# Patient Record
Sex: FEMALE | Race: WHITE
[De-identification: names, ages, dates, MRNs, and addresses within clinical notes are randomized per-mention and may not be internally consistent; named-entity substitution may affect disease eponyms.]

---

## 2018-05-23 ENCOUNTER — HOSPITAL ENCOUNTER (OUTPATIENT)
Dept: HOSPITAL 94 - WOUND CARE | Age: 83
Discharge: HOME | End: 2018-05-23
Attending: SURGERY
Payer: MEDICARE

## 2018-05-23 DIAGNOSIS — L89.229: ICD-10-CM

## 2018-05-23 DIAGNOSIS — Z86.73: ICD-10-CM

## 2018-05-23 DIAGNOSIS — L97.521: ICD-10-CM

## 2018-05-23 DIAGNOSIS — E11.621: ICD-10-CM

## 2018-05-23 DIAGNOSIS — I70.243: ICD-10-CM

## 2018-05-23 DIAGNOSIS — L89.629: ICD-10-CM

## 2018-05-23 DIAGNOSIS — E11.622: Primary | ICD-10-CM

## 2018-05-23 DIAGNOSIS — L97.321: ICD-10-CM

## 2018-05-23 DIAGNOSIS — L97.511: ICD-10-CM

## 2018-05-23 DIAGNOSIS — L97.421: ICD-10-CM

## 2018-05-23 DIAGNOSIS — L98.491: ICD-10-CM

## 2018-05-23 DIAGNOSIS — L89.529: ICD-10-CM

## 2018-05-23 DIAGNOSIS — L89.219: ICD-10-CM

## 2018-05-23 DIAGNOSIS — L89.899: ICD-10-CM

## 2018-05-23 DIAGNOSIS — L97.411: ICD-10-CM

## 2018-05-23 DIAGNOSIS — L97.311: ICD-10-CM

## 2018-05-23 DIAGNOSIS — L97.811: ICD-10-CM

## 2018-05-23 DIAGNOSIS — L89.519: ICD-10-CM

## 2018-05-23 PROCEDURE — 99215 OFFICE O/P EST HI 40 MIN: CPT

## 2018-05-23 PROCEDURE — 36416 COLLJ CAPILLARY BLOOD SPEC: CPT

## 2018-05-23 PROCEDURE — 82948 REAGENT STRIP/BLOOD GLUCOSE: CPT

## 2018-05-23 PROCEDURE — 93925 LOWER EXTREMITY STUDY: CPT

## 2018-06-27 ENCOUNTER — HOSPITAL ENCOUNTER (OUTPATIENT)
Dept: HOSPITAL 94 - WOUND CARE | Age: 83
Discharge: HOME | End: 2018-06-27
Attending: SURGERY
Payer: MEDICARE

## 2018-06-27 DIAGNOSIS — Z79.01: ICD-10-CM

## 2018-06-27 DIAGNOSIS — E43: ICD-10-CM

## 2018-06-27 DIAGNOSIS — L89.223: ICD-10-CM

## 2018-06-27 DIAGNOSIS — M86.8X7: ICD-10-CM

## 2018-06-27 DIAGNOSIS — Z79.4: ICD-10-CM
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
## 2018-06-27 DIAGNOSIS — L97.411: ICD-10-CM

## 2018-06-27 DIAGNOSIS — L89.519: ICD-10-CM

## 2018-06-27 DIAGNOSIS — L89.213: ICD-10-CM

## 2018-06-27 DIAGNOSIS — L97.511: ICD-10-CM

## 2018-06-27 DIAGNOSIS — L89.894: ICD-10-CM

## 2018-06-27 DIAGNOSIS — E11.622: ICD-10-CM

## 2018-06-27 DIAGNOSIS — R53.2: ICD-10-CM

## 2018-06-27 DIAGNOSIS — L97.311: ICD-10-CM

## 2018-06-27 DIAGNOSIS — E11.52: ICD-10-CM

## 2018-06-27 DIAGNOSIS — E11.69: ICD-10-CM

## 2018-06-27 DIAGNOSIS — Z79.899: ICD-10-CM

## 2018-06-27 DIAGNOSIS — L98.491: ICD-10-CM

## 2018-06-27 DIAGNOSIS — L89.610: ICD-10-CM
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
## 2018-06-27 DIAGNOSIS — Z86.73: ICD-10-CM

## 2018-06-27 DIAGNOSIS — Z79.82: ICD-10-CM

## 2018-06-27 DIAGNOSIS — T81.89XA: Primary | ICD-10-CM

## 2018-06-27 DIAGNOSIS — I96: ICD-10-CM

## 2018-06-27 PROCEDURE — 99215 OFFICE O/P EST HI 40 MIN: CPT

## 2018-07-09 ENCOUNTER — HOSPITAL ENCOUNTER (OUTPATIENT)
Dept: HOSPITAL 94 - WOUND CARE | Age: 83
Discharge: HOME | End: 2018-07-09
Attending: SURGERY
Payer: MEDICARE

## 2018-07-09 DIAGNOSIS — E11.69: ICD-10-CM

## 2018-07-09 DIAGNOSIS — I96: ICD-10-CM

## 2018-07-09 DIAGNOSIS — R53.2: ICD-10-CM

## 2018-07-09 DIAGNOSIS — E11.622: ICD-10-CM

## 2018-07-09 DIAGNOSIS — Z79.82: ICD-10-CM

## 2018-07-09 DIAGNOSIS — L89.213: ICD-10-CM

## 2018-07-09 DIAGNOSIS — L89.610: ICD-10-CM

## 2018-07-09 DIAGNOSIS — L89.519: ICD-10-CM

## 2018-07-09 DIAGNOSIS — E43: ICD-10-CM

## 2018-07-09 DIAGNOSIS — M86.8X7: ICD-10-CM

## 2018-07-09 DIAGNOSIS — Z79.4: ICD-10-CM

## 2018-07-09 DIAGNOSIS — T87.89: Primary | ICD-10-CM

## 2018-07-09 DIAGNOSIS — Z86.73: ICD-10-CM

## 2018-07-09 DIAGNOSIS — E11.52: ICD-10-CM

## 2018-07-09 DIAGNOSIS — L89.223: ICD-10-CM

## 2018-07-09 DIAGNOSIS — L97.411: ICD-10-CM

## 2018-07-09 DIAGNOSIS — L97.311: ICD-10-CM

## 2018-07-09 DIAGNOSIS — L97.511: ICD-10-CM

## 2018-07-09 DIAGNOSIS — L89.890: ICD-10-CM

## 2018-07-09 DIAGNOSIS — L98.491: ICD-10-CM

## 2018-07-09 DIAGNOSIS — L89.894: ICD-10-CM

## 2018-07-09 DIAGNOSIS — Z79.01: ICD-10-CM
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
## 2018-07-09 DIAGNOSIS — Z79.899: ICD-10-CM

## 2018-07-09 PROCEDURE — 11046 DBRDMT MUSC&/FSCA EA ADDL: CPT

## 2018-07-09 PROCEDURE — 11042 DBRDMT SUBQ TIS 1ST 20SQCM/<: CPT

## 2018-07-09 PROCEDURE — 11043 DBRDMT MUSC&/FSCA 1ST 20/<: CPT

## 2018-07-23 ENCOUNTER — HOSPITAL ENCOUNTER (OUTPATIENT)
Dept: HOSPITAL 94 - WOUND CARE | Age: 83
Discharge: HOME | End: 2018-07-23
Attending: SURGERY
Payer: MEDICARE

## 2018-07-23 DIAGNOSIS — T81.89XD: Primary | ICD-10-CM
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
## 2018-07-23 DIAGNOSIS — I96: ICD-10-CM

## 2018-07-23 DIAGNOSIS — L89.519: ICD-10-CM

## 2018-07-23 DIAGNOSIS — E11.621: ICD-10-CM

## 2018-07-23 DIAGNOSIS — M86.8X7: ICD-10-CM

## 2018-07-23 DIAGNOSIS — L89.890: ICD-10-CM

## 2018-07-23 DIAGNOSIS — Z86.73: ICD-10-CM

## 2018-07-23 DIAGNOSIS — L89.610: ICD-10-CM

## 2018-07-23 DIAGNOSIS — L89.213: ICD-10-CM

## 2018-07-23 DIAGNOSIS — E11.622: ICD-10-CM

## 2018-07-23 DIAGNOSIS — L97.511: ICD-10-CM

## 2018-07-23 DIAGNOSIS — G89.29: ICD-10-CM

## 2018-07-23 DIAGNOSIS — E11.52: ICD-10-CM

## 2018-07-23 DIAGNOSIS — E11.69: ICD-10-CM

## 2018-07-23 DIAGNOSIS — Z79.01: ICD-10-CM

## 2018-07-23 DIAGNOSIS — Z79.4: ICD-10-CM

## 2018-07-23 DIAGNOSIS — E43: ICD-10-CM

## 2018-07-23 DIAGNOSIS — Z79.82: ICD-10-CM

## 2018-07-23 DIAGNOSIS — L97.311: ICD-10-CM

## 2018-07-23 DIAGNOSIS — L98.491: ICD-10-CM

## 2018-07-23 DIAGNOSIS — L89.894: ICD-10-CM

## 2018-07-23 DIAGNOSIS — Z79.899: ICD-10-CM

## 2018-07-23 DIAGNOSIS — L97.411: ICD-10-CM

## 2018-07-23 DIAGNOSIS — L89.223: ICD-10-CM

## 2018-07-23 DIAGNOSIS — R53.2: ICD-10-CM

## 2018-07-23 PROCEDURE — 11042 DBRDMT SUBQ TIS 1ST 20SQCM/<: CPT

## 2018-07-23 PROCEDURE — 99215 OFFICE O/P EST HI 40 MIN: CPT

## 2018-08-13 ENCOUNTER — HOSPITAL ENCOUNTER (OUTPATIENT)
Dept: HOSPITAL 94 - WOUND CARE | Age: 83
Discharge: HOME | End: 2018-08-13
Attending: SURGERY
Payer: MEDICARE

## 2018-08-13 DIAGNOSIS — L89.223: ICD-10-CM

## 2018-08-13 DIAGNOSIS — Z86.73: ICD-10-CM

## 2018-08-13 DIAGNOSIS — I96: ICD-10-CM

## 2018-08-13 DIAGNOSIS — E11.52: ICD-10-CM

## 2018-08-13 DIAGNOSIS — Z79.01: ICD-10-CM

## 2018-08-13 DIAGNOSIS — L89.519: ICD-10-CM

## 2018-08-13 DIAGNOSIS — L97.411: ICD-10-CM

## 2018-08-13 DIAGNOSIS — L89.213: ICD-10-CM

## 2018-08-13 DIAGNOSIS — E11.622: ICD-10-CM

## 2018-08-13 DIAGNOSIS — L89.894: ICD-10-CM

## 2018-08-13 DIAGNOSIS — E11.69: ICD-10-CM

## 2018-08-13 DIAGNOSIS — R53.2: ICD-10-CM

## 2018-08-13 DIAGNOSIS — L89.890: ICD-10-CM

## 2018-08-13 DIAGNOSIS — L97.311: ICD-10-CM

## 2018-08-13 DIAGNOSIS — Z79.4: ICD-10-CM

## 2018-08-13 DIAGNOSIS — Z79.82: ICD-10-CM

## 2018-08-13 DIAGNOSIS — L97.511: ICD-10-CM

## 2018-08-13 DIAGNOSIS — Z79.899: ICD-10-CM

## 2018-08-13 DIAGNOSIS — M86.8X7: ICD-10-CM

## 2018-08-13 DIAGNOSIS — L89.610: ICD-10-CM

## 2018-08-13 DIAGNOSIS — T87.89: Primary | ICD-10-CM

## 2018-08-13 DIAGNOSIS — L98.491: ICD-10-CM

## 2018-08-13 DIAGNOSIS — E43: ICD-10-CM

## 2018-08-13 DIAGNOSIS — G89.29: ICD-10-CM
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
## 2018-08-13 DIAGNOSIS — E11.621: ICD-10-CM

## 2018-08-13 PROCEDURE — 11042 DBRDMT SUBQ TIS 1ST 20SQCM/<: CPT

## 2018-09-10 ENCOUNTER — HOSPITAL ENCOUNTER (OUTPATIENT)
Dept: HOSPITAL 94 - WOUND CARE | Age: 83
Discharge: HOME | End: 2018-09-10
Attending: SURGERY
Payer: MEDICARE

## 2018-09-10 DIAGNOSIS — T87.89: Primary | ICD-10-CM

## 2018-09-10 DIAGNOSIS — Z79.82: ICD-10-CM

## 2018-09-10 DIAGNOSIS — R53.2: ICD-10-CM
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
## 2018-09-10 DIAGNOSIS — Z86.73: ICD-10-CM

## 2018-09-10 DIAGNOSIS — L89.890: ICD-10-CM

## 2018-09-10 DIAGNOSIS — L97.511: ICD-10-CM

## 2018-09-10 DIAGNOSIS — L89.223: ICD-10-CM

## 2018-09-10 DIAGNOSIS — Z79.4: ICD-10-CM

## 2018-09-10 DIAGNOSIS — L97.311: ICD-10-CM

## 2018-09-10 DIAGNOSIS — L97.411: ICD-10-CM

## 2018-09-10 DIAGNOSIS — L98.491: ICD-10-CM

## 2018-09-10 DIAGNOSIS — Z79.01: ICD-10-CM

## 2018-09-10 DIAGNOSIS — E43: ICD-10-CM

## 2018-09-10 DIAGNOSIS — E11.621: ICD-10-CM

## 2018-09-10 DIAGNOSIS — M86.8X7: ICD-10-CM

## 2018-09-10 DIAGNOSIS — E11.69: ICD-10-CM

## 2018-09-10 DIAGNOSIS — L89.894: ICD-10-CM

## 2018-09-10 DIAGNOSIS — G89.29: ICD-10-CM

## 2018-09-10 DIAGNOSIS — E11.52: ICD-10-CM

## 2018-09-10 DIAGNOSIS — E11.622: ICD-10-CM

## 2018-09-10 DIAGNOSIS — I96: ICD-10-CM

## 2018-09-10 DIAGNOSIS — L89.213: ICD-10-CM

## 2018-09-10 DIAGNOSIS — Z79.899: ICD-10-CM

## 2018-09-10 DIAGNOSIS — L89.610: ICD-10-CM

## 2018-09-10 PROCEDURE — 17250 CHEM CAUT OF GRANLTJ TISSUE: CPT

## 2018-10-01 ENCOUNTER — HOSPITAL ENCOUNTER (OUTPATIENT)
Dept: HOSPITAL 94 - WOUND CARE | Age: 83
Discharge: HOME | End: 2018-10-01
Attending: SURGERY
Payer: MEDICARE

## 2018-10-01 DIAGNOSIS — E11.69: ICD-10-CM

## 2018-10-01 DIAGNOSIS — E11.622: ICD-10-CM

## 2018-10-01 DIAGNOSIS — E11.621: ICD-10-CM

## 2018-10-01 DIAGNOSIS — Z79.01: ICD-10-CM

## 2018-10-01 DIAGNOSIS — L89.894: ICD-10-CM

## 2018-10-01 DIAGNOSIS — L97.411: ICD-10-CM

## 2018-10-01 DIAGNOSIS — E11.52: ICD-10-CM

## 2018-10-01 DIAGNOSIS — L89.890: ICD-10-CM

## 2018-10-01 DIAGNOSIS — Z79.899: ICD-10-CM

## 2018-10-01 DIAGNOSIS — Z79.4: ICD-10-CM

## 2018-10-01 DIAGNOSIS — T87.89: Primary | ICD-10-CM

## 2018-10-01 DIAGNOSIS — L97.311: ICD-10-CM

## 2018-10-01 DIAGNOSIS — G89.29: ICD-10-CM

## 2018-10-01 DIAGNOSIS — L89.610: ICD-10-CM

## 2018-10-01 DIAGNOSIS — M86.8X7: ICD-10-CM

## 2018-10-01 DIAGNOSIS — Z86.73: ICD-10-CM

## 2018-10-01 DIAGNOSIS — R53.2: ICD-10-CM

## 2018-10-01 DIAGNOSIS — E43: ICD-10-CM
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
## 2018-10-01 DIAGNOSIS — L89.223: ICD-10-CM

## 2018-10-01 DIAGNOSIS — Z79.82: ICD-10-CM

## 2018-10-01 DIAGNOSIS — I96: ICD-10-CM

## 2018-10-01 DIAGNOSIS — L98.491: ICD-10-CM

## 2018-10-01 DIAGNOSIS — L89.213: ICD-10-CM

## 2018-10-01 DIAGNOSIS — L97.511: ICD-10-CM

## 2018-10-01 PROCEDURE — 11042 DBRDMT SUBQ TIS 1ST 20SQCM/<: CPT

## 2018-10-01 PROCEDURE — 97598 DBRDMT OPN WND ADDL 20CM/<: CPT

## 2018-10-01 PROCEDURE — 97597 DBRDMT OPN WND 1ST 20 CM/<: CPT

## 2018-10-22 ENCOUNTER — HOSPITAL ENCOUNTER (OUTPATIENT)
Dept: HOSPITAL 94 - WOUND CARE | Age: 83
Discharge: HOME | End: 2018-10-22
Attending: SURGERY
Payer: MEDICARE

## 2018-10-22 DIAGNOSIS — R53.2: ICD-10-CM

## 2018-10-22 DIAGNOSIS — E11.621: ICD-10-CM

## 2018-10-22 DIAGNOSIS — L97.311: ICD-10-CM

## 2018-10-22 DIAGNOSIS — Z79.4: ICD-10-CM

## 2018-10-22 DIAGNOSIS — E43: ICD-10-CM
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
## 2018-10-22 DIAGNOSIS — L89.213: ICD-10-CM

## 2018-10-22 DIAGNOSIS — L97.411: ICD-10-CM

## 2018-10-22 DIAGNOSIS — E11.69: ICD-10-CM

## 2018-10-22 DIAGNOSIS — E11.622: ICD-10-CM

## 2018-10-22 DIAGNOSIS — T87.89: Primary | ICD-10-CM

## 2018-10-22 DIAGNOSIS — E11.52: ICD-10-CM

## 2018-10-22 DIAGNOSIS — Z79.01: ICD-10-CM

## 2018-10-22 DIAGNOSIS — Z79.899: ICD-10-CM

## 2018-10-22 DIAGNOSIS — L98.491: ICD-10-CM

## 2018-10-22 DIAGNOSIS — I96: ICD-10-CM

## 2018-10-22 DIAGNOSIS — G89.29: ICD-10-CM

## 2018-10-22 DIAGNOSIS — Z86.73: ICD-10-CM

## 2018-10-22 DIAGNOSIS — L97.511: ICD-10-CM

## 2018-10-22 DIAGNOSIS — Z79.82: ICD-10-CM

## 2018-10-22 DIAGNOSIS — L89.223: ICD-10-CM

## 2018-10-22 DIAGNOSIS — L89.894: ICD-10-CM

## 2018-10-22 DIAGNOSIS — L89.610: ICD-10-CM

## 2018-10-22 DIAGNOSIS — L89.890: ICD-10-CM

## 2018-10-22 DIAGNOSIS — M86.8X7: ICD-10-CM

## 2018-10-22 PROCEDURE — 17250 CHEM CAUT OF GRANLTJ TISSUE: CPT

## 2018-10-22 PROCEDURE — 11042 DBRDMT SUBQ TIS 1ST 20SQCM/<: CPT

## 2018-11-12 ENCOUNTER — HOSPITAL ENCOUNTER (OUTPATIENT)
Dept: HOSPITAL 94 - WOUND CARE | Age: 83
Discharge: HOME | End: 2018-11-12
Attending: SURGERY
Payer: MEDICARE

## 2018-11-12 DIAGNOSIS — Z86.73: ICD-10-CM

## 2018-11-12 DIAGNOSIS — Z79.01: ICD-10-CM

## 2018-11-12 DIAGNOSIS — M86.8X7: ICD-10-CM

## 2018-11-12 DIAGNOSIS — E11.52: ICD-10-CM

## 2018-11-12 DIAGNOSIS — E11.621: ICD-10-CM

## 2018-11-12 DIAGNOSIS — E11.622: ICD-10-CM

## 2018-11-12 DIAGNOSIS — L97.511: ICD-10-CM

## 2018-11-12 DIAGNOSIS — L89.893: ICD-10-CM

## 2018-11-12 DIAGNOSIS — I96: ICD-10-CM

## 2018-11-12 DIAGNOSIS — Z79.4: ICD-10-CM
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
## 2018-11-12 DIAGNOSIS — R53.2: ICD-10-CM

## 2018-11-12 DIAGNOSIS — Z79.82: ICD-10-CM

## 2018-11-12 DIAGNOSIS — T87.89: Primary | ICD-10-CM

## 2018-11-12 DIAGNOSIS — E11.69: ICD-10-CM

## 2018-11-12 DIAGNOSIS — E43: ICD-10-CM

## 2018-11-12 DIAGNOSIS — L98.492: ICD-10-CM

## 2018-11-12 DIAGNOSIS — L89.894: ICD-10-CM

## 2018-11-12 DIAGNOSIS — G89.29: ICD-10-CM

## 2018-11-12 DIAGNOSIS — L89.213: ICD-10-CM

## 2018-11-12 DIAGNOSIS — Z79.899: ICD-10-CM

## 2018-11-12 PROCEDURE — 11043 DBRDMT MUSC&/FSCA 1ST 20/<: CPT

## 2018-11-12 PROCEDURE — 11042 DBRDMT SUBQ TIS 1ST 20SQCM/<: CPT

## 2018-11-26 ENCOUNTER — HOSPITAL ENCOUNTER (OUTPATIENT)
Dept: HOSPITAL 94 - WOUND CARE | Age: 83
Discharge: HOME | End: 2018-11-26
Attending: SURGERY
Payer: MEDICARE

## 2018-11-26 DIAGNOSIS — E11.622: ICD-10-CM

## 2018-11-26 DIAGNOSIS — E11.69: ICD-10-CM

## 2018-11-26 DIAGNOSIS — E11.621: ICD-10-CM

## 2018-11-26 DIAGNOSIS — Z79.01: ICD-10-CM

## 2018-11-26 DIAGNOSIS — R53.2: ICD-10-CM

## 2018-11-26 DIAGNOSIS — L89.894: ICD-10-CM
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
## 2018-11-26 DIAGNOSIS — Z86.73: ICD-10-CM

## 2018-11-26 DIAGNOSIS — L97.511: ICD-10-CM

## 2018-11-26 DIAGNOSIS — L89.223: ICD-10-CM

## 2018-11-26 DIAGNOSIS — Z79.82: ICD-10-CM

## 2018-11-26 DIAGNOSIS — T87.89: Primary | ICD-10-CM

## 2018-11-26 DIAGNOSIS — Z79.4: ICD-10-CM

## 2018-11-26 DIAGNOSIS — E43: ICD-10-CM

## 2018-11-26 DIAGNOSIS — G89.29: ICD-10-CM

## 2018-11-26 DIAGNOSIS — M86.8X7: ICD-10-CM

## 2018-11-26 DIAGNOSIS — L89.213: ICD-10-CM

## 2018-11-26 DIAGNOSIS — L98.492: ICD-10-CM

## 2018-11-26 DIAGNOSIS — E11.52: ICD-10-CM

## 2018-11-26 DIAGNOSIS — L89.893: ICD-10-CM

## 2018-11-26 DIAGNOSIS — I96: ICD-10-CM

## 2018-11-26 DIAGNOSIS — Z79.899: ICD-10-CM

## 2018-11-26 PROCEDURE — 97597 DBRDMT OPN WND 1ST 20 CM/<: CPT

## 2018-12-03 ENCOUNTER — HOSPITAL ENCOUNTER (OUTPATIENT)
Dept: HOSPITAL 94 - WOUND CARE | Age: 83
Discharge: HOME | End: 2018-12-03
Attending: SURGERY
Payer: MEDICARE

## 2018-12-03 DIAGNOSIS — E43: ICD-10-CM

## 2018-12-03 DIAGNOSIS — Z79.4: ICD-10-CM

## 2018-12-03 DIAGNOSIS — L89.223: ICD-10-CM

## 2018-12-03 DIAGNOSIS — E11.622: Primary | ICD-10-CM

## 2018-12-03 DIAGNOSIS — R53.2: ICD-10-CM

## 2018-12-03 DIAGNOSIS — E11.52: ICD-10-CM

## 2018-12-03 DIAGNOSIS — Z79.82: ICD-10-CM

## 2018-12-03 DIAGNOSIS — Z86.73: ICD-10-CM

## 2018-12-03 DIAGNOSIS — L89.893: ICD-10-CM

## 2018-12-03 DIAGNOSIS — G89.29: ICD-10-CM

## 2018-12-03 DIAGNOSIS — E11.69: ICD-10-CM

## 2018-12-03 DIAGNOSIS — Z79.01: ICD-10-CM

## 2018-12-03 DIAGNOSIS — L89.213: ICD-10-CM

## 2018-12-03 DIAGNOSIS — Z79.899: ICD-10-CM

## 2018-12-03 DIAGNOSIS — L97.511: ICD-10-CM

## 2018-12-03 DIAGNOSIS — I96: ICD-10-CM

## 2018-12-03 DIAGNOSIS — L89.894: ICD-10-CM

## 2018-12-03 DIAGNOSIS — M86.8X7: ICD-10-CM

## 2018-12-03 DIAGNOSIS — E11.621: ICD-10-CM

## 2018-12-03 DIAGNOSIS — L98.492: ICD-10-CM

## 2018-12-03 PROCEDURE — 87186 SC STD MICRODIL/AGAR DIL: CPT

## 2018-12-03 PROCEDURE — 87077 CULTURE AEROBIC IDENTIFY: CPT

## 2018-12-03 PROCEDURE — 87070 CULTURE OTHR SPECIMN AEROBIC: CPT

## 2018-12-03 PROCEDURE — 87102 FUNGUS ISOLATION CULTURE: CPT

## 2018-12-03 PROCEDURE — 87075 CULTR BACTERIA EXCEPT BLOOD: CPT

## 2018-12-03 PROCEDURE — 11043 DBRDMT MUSC&/FSCA 1ST 20/<: CPT

## 2018-12-03 PROCEDURE — 87176 TISSUE HOMOGENIZATION CULTR: CPT

## 2018-12-19 ENCOUNTER — HOSPITAL ENCOUNTER (OUTPATIENT)
Dept: HOSPITAL 94 - WOUND CARE | Age: 83
Discharge: HOME | End: 2018-12-19
Attending: SURGERY
Payer: MEDICARE

## 2018-12-19 DIAGNOSIS — Z79.4: ICD-10-CM

## 2018-12-19 DIAGNOSIS — G89.29: ICD-10-CM

## 2018-12-19 DIAGNOSIS — Z79.82: ICD-10-CM

## 2018-12-19 DIAGNOSIS — L97.511: ICD-10-CM

## 2018-12-19 DIAGNOSIS — E11.621: ICD-10-CM

## 2018-12-19 DIAGNOSIS — Z79.899: ICD-10-CM

## 2018-12-19 DIAGNOSIS — L89.893: ICD-10-CM

## 2018-12-19 DIAGNOSIS — I96: ICD-10-CM

## 2018-12-19 DIAGNOSIS — Z79.01: ICD-10-CM

## 2018-12-19 DIAGNOSIS — L89.213: ICD-10-CM

## 2018-12-19 DIAGNOSIS — R53.2: ICD-10-CM

## 2018-12-19 DIAGNOSIS — E11.622: Primary | ICD-10-CM

## 2018-12-19 DIAGNOSIS — L98.492: ICD-10-CM

## 2018-12-19 DIAGNOSIS — Z86.73: ICD-10-CM

## 2018-12-19 DIAGNOSIS — E43: ICD-10-CM

## 2018-12-19 DIAGNOSIS — L89.894: ICD-10-CM

## 2018-12-19 DIAGNOSIS — L89.223: ICD-10-CM

## 2018-12-19 DIAGNOSIS — E11.52: ICD-10-CM

## 2018-12-19 DIAGNOSIS — M86.8X7: ICD-10-CM

## 2018-12-19 DIAGNOSIS — E11.69: ICD-10-CM

## 2018-12-19 PROCEDURE — 97597 DBRDMT OPN WND 1ST 20 CM/<: CPT

## 2019-01-11 ENCOUNTER — HOSPITAL ENCOUNTER (INPATIENT)
Dept: HOSPITAL 94 - ER | Age: 84
LOS: 9 days | DRG: 299 | End: 2019-01-20
Attending: HOSPITALIST | Admitting: INTERNAL MEDICINE
Payer: MEDICARE

## 2019-01-11 VITALS — HEIGHT: 62 IN | WEIGHT: 109.35 LBS | BODY MASS INDEX: 20.12 KG/M2

## 2019-01-11 DIAGNOSIS — E11.621: ICD-10-CM

## 2019-01-11 DIAGNOSIS — L97.519: ICD-10-CM

## 2019-01-11 DIAGNOSIS — E87.0: ICD-10-CM

## 2019-01-11 DIAGNOSIS — D63.8: ICD-10-CM

## 2019-01-11 DIAGNOSIS — Z79.82: ICD-10-CM

## 2019-01-11 DIAGNOSIS — E11.52: Primary | ICD-10-CM

## 2019-01-11 DIAGNOSIS — F03.90: ICD-10-CM

## 2019-01-11 DIAGNOSIS — G89.29: ICD-10-CM

## 2019-01-11 DIAGNOSIS — L89.154: ICD-10-CM

## 2019-01-11 DIAGNOSIS — Z79.84: ICD-10-CM

## 2019-01-11 DIAGNOSIS — G93.41: ICD-10-CM

## 2019-01-11 DIAGNOSIS — M86.171: ICD-10-CM

## 2019-01-11 DIAGNOSIS — E11.69: ICD-10-CM

## 2019-01-11 DIAGNOSIS — Z86.73: ICD-10-CM

## 2019-01-11 DIAGNOSIS — E86.0: ICD-10-CM

## 2019-01-11 DIAGNOSIS — E11.65: ICD-10-CM

## 2019-01-11 DIAGNOSIS — J96.90: ICD-10-CM

## 2019-01-11 DIAGNOSIS — Z89.512: ICD-10-CM

## 2019-01-11 DIAGNOSIS — Z66: ICD-10-CM

## 2019-01-11 DIAGNOSIS — Z51.5: ICD-10-CM

## 2019-01-11 DIAGNOSIS — L89.214: ICD-10-CM

## 2019-01-11 DIAGNOSIS — E87.6: ICD-10-CM

## 2019-01-11 LAB
ALBUMIN SERPL BCP-MCNC: 2.5 G/DL (ref 3.4–5)
ALBUMIN/GLOB SERPL: 0.5 {RATIO} (ref 1.1–1.5)
ALP SERPL-CCNC: 81 IU/L (ref 46–116)
ALT SERPL W P-5'-P-CCNC: 14 U/L (ref 12–78)
ANION GAP SERPL CALCULATED.3IONS-SCNC: 7 MMOL/L (ref 8–16)
AST SERPL W P-5'-P-CCNC: 9 U/L (ref 10–37)
BASOPHILS # BLD AUTO: 0 X10'3 (ref 0–0.2)
BASOPHILS NFR BLD AUTO: 0.4 % (ref 0–1)
BILIRUB SERPL-MCNC: 0.2 MG/DL (ref 0.1–1)
BUN SERPL-MCNC: 24 MG/DL (ref 7–18)
BUN/CREAT SERPL: 51.1 (ref 6.6–38)
CALCIUM SERPL-MCNC: 8.5 MG/DL (ref 8.5–10.1)
CHLORIDE SERPL-SCNC: 106 MMOL/L (ref 99–107)
CO2 SERPL-SCNC: 28.8 MMOL/L (ref 24–32)
CREAT SERPL-MCNC: 0.47 MG/DL (ref 0.4–0.9)
EOSINOPHIL # BLD AUTO: 0.1 X10'3 (ref 0–0.9)
EOSINOPHIL NFR BLD AUTO: 0.9 % (ref 0–6)
ERYTHROCYTE [DISTWIDTH] IN BLOOD BY AUTOMATED COUNT: 13.2 % (ref 11.5–14.5)
GFR SERPL CREATININE-BSD FRML MDRD: > 90 ML/MIN
GLUCOSE SERPL-MCNC: 244 MG/DL (ref 70–104)
HCT VFR BLD AUTO: 35.1 % (ref 35–45)
HGB BLD-MCNC: 11.2 G/DL (ref 12–16)
LYMPHOCYTES # BLD AUTO: 2.1 X10'3 (ref 1.1–4.8)
LYMPHOCYTES NFR BLD AUTO: 21.2 % (ref 21–51)
MCH RBC QN AUTO: 28.1 PG (ref 27–31)
MCHC RBC AUTO-ENTMCNC: 31.9 % (ref 33–36.5)
MCV RBC AUTO: 88.3 FL (ref 78–98)
MONOCYTES # BLD AUTO: 0.4 X10'3 (ref 0–0.9)
MONOCYTES NFR BLD AUTO: 4.1 % (ref 2–12)
NEUTROPHILS # BLD AUTO: 7.5 X10'3 (ref 1.8–7.7)
NEUTROPHILS NFR BLD AUTO: 73.4 % (ref 42–75)
PLATELET # BLD AUTO: 406 X10'3 (ref 140–440)
PMV BLD AUTO: 8 FL (ref 7.4–10.4)
POTASSIUM SERPL-SCNC: 2.9 MMOL/L (ref 3.5–5.1)
PROT SERPL-MCNC: 7.1 G/DL (ref 6.4–8.2)
RBC # BLD AUTO: 3.98 X10'6 (ref 4.2–5.6)
SODIUM SERPL-SCNC: 142 MMOL/L (ref 135–145)
WBC # BLD AUTO: 10.1 X10'3 (ref 4.5–11)

## 2019-01-11 PROCEDURE — 83036 HEMOGLOBIN GLYCOSYLATED A1C: CPT

## 2019-01-11 PROCEDURE — 83735 ASSAY OF MAGNESIUM: CPT

## 2019-01-11 PROCEDURE — 94760 N-INVAS EAR/PLS OXIMETRY 1: CPT

## 2019-01-11 PROCEDURE — 84132 ASSAY OF SERUM POTASSIUM: CPT

## 2019-01-11 PROCEDURE — 85025 COMPLETE CBC W/AUTO DIFF WBC: CPT

## 2019-01-11 PROCEDURE — 80053 COMPREHEN METABOLIC PANEL: CPT

## 2019-01-11 PROCEDURE — 81001 URINALYSIS AUTO W/SCOPE: CPT

## 2019-01-11 PROCEDURE — 71045 X-RAY EXAM CHEST 1 VIEW: CPT

## 2019-01-11 PROCEDURE — 85651 RBC SED RATE NONAUTOMATED: CPT

## 2019-01-11 PROCEDURE — 94640 AIRWAY INHALATION TREATMENT: CPT

## 2019-01-11 PROCEDURE — 96365 THER/PROPH/DIAG IV INF INIT: CPT

## 2019-01-11 PROCEDURE — 97162 PT EVAL MOD COMPLEX 30 MIN: CPT

## 2019-01-11 PROCEDURE — 82948 REAGENT STRIP/BLOOD GLUCOSE: CPT

## 2019-01-11 PROCEDURE — 97110 THERAPEUTIC EXERCISES: CPT

## 2019-01-11 PROCEDURE — 80202 ASSAY OF VANCOMYCIN: CPT

## 2019-01-11 PROCEDURE — 80061 LIPID PANEL: CPT

## 2019-01-11 PROCEDURE — 36415 COLL VENOUS BLD VENIPUNCTURE: CPT

## 2019-01-11 PROCEDURE — 99285 EMERGENCY DEPT VISIT HI MDM: CPT

## 2019-01-11 PROCEDURE — 87077 CULTURE AEROBIC IDENTIFY: CPT

## 2019-01-11 PROCEDURE — 87070 CULTURE OTHR SPECIMN AEROBIC: CPT

## 2019-01-11 PROCEDURE — 80048 BASIC METABOLIC PNL TOTAL CA: CPT

## 2019-01-11 PROCEDURE — 73630 X-RAY EXAM OF FOOT: CPT

## 2019-01-11 PROCEDURE — 87088 URINE BACTERIA CULTURE: CPT

## 2019-01-11 PROCEDURE — 92616 FEES W/LARYNGEAL SENSE TEST: CPT

## 2019-01-11 PROCEDURE — 87186 SC STD MICRODIL/AGAR DIL: CPT

## 2019-01-11 RX ADMIN — Medication PRN MLS/HR: at 20:29

## 2019-01-11 RX ADMIN — INSULIN GLARGINE SCH UNIT: 100 INJECTION, SOLUTION SUBCUTANEOUS at 20:59

## 2019-01-11 RX ADMIN — DOCUSATE SODIUM SCH MG: 100 CAPSULE, LIQUID FILLED ORAL at 20:00

## 2019-01-11 RX ADMIN — Medication SCH MG: at 20:00

## 2019-01-11 RX ADMIN — HEPARIN SODIUM SCH UNIT: 5000 INJECTION, SOLUTION INTRAVENOUS; SUBCUTANEOUS at 20:19

## 2019-01-11 RX ADMIN — SODIUM CHLORIDE SCH MLS/HR: 9 INJECTION INTRAMUSCULAR; INTRAVENOUS; SUBCUTANEOUS at 17:33

## 2019-01-12 VITALS — SYSTOLIC BLOOD PRESSURE: 153 MMHG | DIASTOLIC BLOOD PRESSURE: 70 MMHG

## 2019-01-12 LAB
ALBUMIN SERPL BCP-MCNC: 2.5 G/DL (ref 3.4–5)
ANION GAP SERPL CALCULATED.3IONS-SCNC: 8 MMOL/L (ref 8–16)
BASOPHILS # BLD AUTO: 0 X10'3 (ref 0–0.2)
BASOPHILS NFR BLD AUTO: 0.4 % (ref 0–1)
BUN SERPL-MCNC: 18 MG/DL (ref 7–18)
BUN/CREAT SERPL: 40.9 (ref 6.6–38)
CALCIUM SERPL-MCNC: 8.6 MG/DL (ref 8.5–10.1)
CHLORIDE SERPL-SCNC: 111 MMOL/L (ref 99–107)
CHOLEST SERPL-MCNC: 212 MG/DL (ref 0–200)
CHOLEST/HDLC SERPL: 6.4 {RATIO} (ref 0–4.99)
CO2 SERPL-SCNC: 26.8 MMOL/L (ref 24–32)
CREAT SERPL-MCNC: 0.44 MG/DL (ref 0.4–0.9)
EOSINOPHIL # BLD AUTO: 0.1 X10'3 (ref 0–0.9)
EOSINOPHIL NFR BLD AUTO: 0.8 % (ref 0–6)
ERYTHROCYTE [DISTWIDTH] IN BLOOD BY AUTOMATED COUNT: 13.6 % (ref 11.5–14.5)
GFR SERPL CREATININE-BSD FRML MDRD: > 90 ML/MIN
GLUCOSE SERPL-MCNC: 139 MG/DL (ref 70–104)
HCT VFR BLD AUTO: 36.5 % (ref 35–45)
HDLC SERPL-MCNC: 33 MG/DL (ref 35–60)
HGB BLD-MCNC: 11.4 G/DL (ref 12–16)
LDLC SERPL DIRECT ASSAY-MCNC: 142 MG/DL (ref 50–100)
LYMPHOCYTES # BLD AUTO: 2.4 X10'3 (ref 1.1–4.8)
LYMPHOCYTES NFR BLD AUTO: 23.8 % (ref 21–51)
MAGNESIUM SERPL-MCNC: 2 MG/DL (ref 1.5–2.4)
MCH RBC QN AUTO: 27.7 PG (ref 27–31)
MCHC RBC AUTO-ENTMCNC: 31.3 % (ref 33–36.5)
MCV RBC AUTO: 88.5 FL (ref 78–98)
MONOCYTES # BLD AUTO: 0.5 X10'3 (ref 0–0.9)
MONOCYTES NFR BLD AUTO: 4.8 % (ref 2–12)
NEUTROPHILS # BLD AUTO: 7.3 X10'3 (ref 1.8–7.7)
NEUTROPHILS NFR BLD AUTO: 70.2 % (ref 42–75)
PLATELET # BLD AUTO: 346 X10'3 (ref 140–440)
PMV BLD AUTO: 8.3 FL (ref 7.4–10.4)
POTASSIUM SERPL-SCNC: 4.2 MMOL/L (ref 3.5–5.1)
RBC # BLD AUTO: 4.13 X10'6 (ref 4.2–5.6)
SODIUM SERPL-SCNC: 146 MMOL/L (ref 135–145)
TRIGL SERPL-MCNC: 178 MG/DL (ref 20–135)
WBC # BLD AUTO: 10.3 X10'3 (ref 4.5–11)

## 2019-01-12 RX ADMIN — HEPARIN SODIUM SCH UNIT: 5000 INJECTION, SOLUTION INTRAVENOUS; SUBCUTANEOUS at 19:59

## 2019-01-12 RX ADMIN — VANCOMYCIN HYDROCHLORIDE SCH MLS/HR: 750 INJECTION, POWDER, LYOPHILIZED, FOR SOLUTION INTRAVENOUS at 17:00

## 2019-01-12 RX ADMIN — INSULIN GLARGINE SCH UNIT: 100 INJECTION, SOLUTION SUBCUTANEOUS at 21:00

## 2019-01-12 RX ADMIN — SODIUM CHLORIDE SCH MLS/HR: 9 INJECTION INTRAMUSCULAR; INTRAVENOUS; SUBCUTANEOUS at 06:59

## 2019-01-12 RX ADMIN — DOCUSATE SODIUM SCH MG: 100 CAPSULE, LIQUID FILLED ORAL at 08:00

## 2019-01-12 RX ADMIN — HEPARIN SODIUM SCH UNIT: 5000 INJECTION, SOLUTION INTRAVENOUS; SUBCUTANEOUS at 08:24

## 2019-01-12 RX ADMIN — Medication SCH MG: at 19:58

## 2019-01-12 RX ADMIN — DOCUSATE SODIUM SCH MG: 100 CAPSULE, LIQUID FILLED ORAL at 19:58

## 2019-01-12 RX ADMIN — Medication SCH MG: at 08:58

## 2019-01-12 RX ADMIN — SODIUM CHLORIDE SCH MLS/HR: 9 INJECTION INTRAMUSCULAR; INTRAVENOUS; SUBCUTANEOUS at 20:06

## 2019-01-12 RX ADMIN — Medication PRN MLS/HR: at 01:32

## 2019-01-12 NOTE — NUR
Problems reprioritized. Patient report given, questions answered & plan of care reviewed 
with Ariadne LOZANO.

## 2019-01-12 NOTE — NUR
Received report from Caitlyn LOZANO. Assumed patient care. Patient is awake and alert but 
nonverbal. In no apparent distress. Call light and items of frequent use within reach. Will 
continue to monitor for changes.

## 2019-01-12 NOTE — NUR
PT HAD FECES INSIDE BRIEF, CLEANED PT, MOVED PT TO HOSPITAL BED AND SEND BOWEL 
SPECIMIN TO LAB TO TEST FOR C. DIFF. PT NOT PLACED BACK IN BRIEF, INSTEAD 
PLACED ON DRY FLOWS. PT STABLE AND COMFORTABLE AT THIS TIME.

## 2019-01-13 VITALS — DIASTOLIC BLOOD PRESSURE: 69 MMHG | SYSTOLIC BLOOD PRESSURE: 156 MMHG

## 2019-01-13 VITALS — SYSTOLIC BLOOD PRESSURE: 150 MMHG | DIASTOLIC BLOOD PRESSURE: 65 MMHG

## 2019-01-13 LAB
ALBUMIN SERPL BCP-MCNC: 2.2 G/DL (ref 3.4–5)
ANION GAP SERPL CALCULATED.3IONS-SCNC: 10 MMOL/L (ref 8–16)
BASOPHILS # BLD AUTO: 0.1 X10'3 (ref 0–0.2)
BASOPHILS NFR BLD AUTO: 0.8 % (ref 0–1)
BUN SERPL-MCNC: 11 MG/DL (ref 7–18)
BUN/CREAT SERPL: 33.3 (ref 6.6–38)
CALCIUM SERPL-MCNC: 8.1 MG/DL (ref 8.5–10.1)
CHLORIDE SERPL-SCNC: 109 MMOL/L (ref 99–107)
CO2 SERPL-SCNC: 25.2 MMOL/L (ref 24–32)
CREAT SERPL-MCNC: 0.33 MG/DL (ref 0.4–0.9)
EOSINOPHIL # BLD AUTO: 0.1 X10'3 (ref 0–0.9)
EOSINOPHIL NFR BLD AUTO: 0.9 % (ref 0–6)
ERYTHROCYTE [DISTWIDTH] IN BLOOD BY AUTOMATED COUNT: 13.4 % (ref 11.5–14.5)
GFR SERPL CREATININE-BSD FRML MDRD: > 90 ML/MIN
GLUCOSE SERPL-MCNC: 113 MG/DL (ref 70–104)
HCT VFR BLD AUTO: 32.6 % (ref 35–45)
HGB BLD-MCNC: 10.1 G/DL (ref 12–16)
LYMPHOCYTES # BLD AUTO: 2.3 X10'3 (ref 1.1–4.8)
LYMPHOCYTES NFR BLD AUTO: 27.6 % (ref 21–51)
MAGNESIUM SERPL-MCNC: 1.8 MG/DL (ref 1.5–2.4)
MCH RBC QN AUTO: 27.1 PG (ref 27–31)
MCHC RBC AUTO-ENTMCNC: 31 % (ref 33–36.5)
MCV RBC AUTO: 87.5 FL (ref 78–98)
MONOCYTES # BLD AUTO: 0.3 X10'3 (ref 0–0.9)
MONOCYTES NFR BLD AUTO: 4 % (ref 2–12)
NEUTROPHILS # BLD AUTO: 5.5 X10'3 (ref 1.8–7.7)
NEUTROPHILS NFR BLD AUTO: 66.7 % (ref 42–75)
PLATELET # BLD AUTO: 338 X10'3 (ref 140–440)
PMV BLD AUTO: 8.8 FL (ref 7.4–10.4)
POTASSIUM SERPL-SCNC: 3 MMOL/L (ref 3.5–5.1)
RBC # BLD AUTO: 3.73 X10'6 (ref 4.2–5.6)
SODIUM SERPL-SCNC: 144 MMOL/L (ref 135–145)
WBC # BLD AUTO: 8.3 X10'3 (ref 4.5–11)

## 2019-01-13 RX ADMIN — VANCOMYCIN HYDROCHLORIDE SCH MLS/HR: 750 INJECTION, POWDER, LYOPHILIZED, FOR SOLUTION INTRAVENOUS at 17:28

## 2019-01-13 RX ADMIN — HEPARIN SODIUM SCH UNIT: 5000 INJECTION, SOLUTION INTRAVENOUS; SUBCUTANEOUS at 08:33

## 2019-01-13 RX ADMIN — DOCUSATE SODIUM SCH MG: 50 LIQUID ORAL at 08:34

## 2019-01-13 RX ADMIN — DOCUSATE SODIUM SCH MG: 50 LIQUID ORAL at 20:06

## 2019-01-13 RX ADMIN — HEPARIN SODIUM SCH UNIT: 5000 INJECTION, SOLUTION INTRAVENOUS; SUBCUTANEOUS at 20:07

## 2019-01-13 RX ADMIN — SODIUM CHLORIDE SCH MLS/HR: 9 INJECTION INTRAMUSCULAR; INTRAVENOUS; SUBCUTANEOUS at 08:31

## 2019-01-13 RX ADMIN — INSULIN GLARGINE SCH UNIT: 100 INJECTION, SOLUTION SUBCUTANEOUS at 21:55

## 2019-01-13 RX ADMIN — INSULIN LISPRO SCH UNITS: 100 INJECTION, SOLUTION INTRAVENOUS; SUBCUTANEOUS at 21:54

## 2019-01-13 RX ADMIN — MINERAL SUPPLEMENT IRON 300 MG / 5 ML STRENGTH LIQUID 100 PER BOX UNFLAVORED SCH MG: at 20:00

## 2019-01-13 RX ADMIN — Medication PRN MLS/HR: at 08:41

## 2019-01-13 RX ADMIN — MINERAL SUPPLEMENT IRON 300 MG / 5 ML STRENGTH LIQUID 100 PER BOX UNFLAVORED SCH MG: at 08:32

## 2019-01-13 NOTE — NUR
Reported off to Caitlyn LOZANO. Patient is awake and alert on room air. In no apparent distress. 
Call light and items of frequent use within reach.

## 2019-01-13 NOTE — NUR
Received report from BLAKE Brady and BLAKE Stockton. Patient is awake and alert on room air, in 
no apparent distress. PCT at bedside. Call light and items of frequent use within reach. 
Will continue to monitor.

## 2019-01-13 NOTE — NUR
11AM VITALS WERE 170'S SYSTOLIC, DR BOGGS NOTIFIED, SHE SAID SHE WOULD ORDER BP MED FOR PT, 
SHE ORDERED AMLODIPINE FOR TOMORROW MORNING. PAGED HER TO CLAIRIFY ORDER IF SHE WANTED IT 
FOR TODAY. UNABLE TO GET AHOLD OF HER. RECHECKED BP AND IT IS /69, HR 70. PT EATING, 
WITH AIDE. NO NEED FOR BP MED RIGHT NOW, WILL MONITOR.

## 2019-01-13 NOTE — NUR
Problems reprioritized. Patient report given, questions answered & plan of care reviewed 
with lawrence.

## 2019-01-13 NOTE — NUR
Wound consult: Pt with multiple wounds. Per physical assessment DM ulcer 

to rt foot, PU to coccyx and bilat hip. Per H&P on right lower extremity 

black eschar present over the fifth toe on the lateral side, signs of 

chronic wound present over second left toe, and second right toe showing 

signs of ischemia and early necrosis. Pt also with hx T2DM with current 

A1c 7.5. Per physical assessment pt nonverbal and A/O x1, DM and protein 

ed not appropriate at this time. Consult states pt is very thin, current 

wt is stable with documented wt from previous visits. Per documentation in 

previous records pt height it 60", currently recorded at 62", new BMI 

using 60" is 18.1, this still puts pt as underweight. Pt currently on a pureed

heart healthy diet with documented PO intake 25/100/100% at breakfast this 

AM meeting nutrient needs. Noted that pt with severe weakness and some 

fluid retention. Pt currently meets criteria for malnutrition, MD 

notified. Will continue to follow.

Recommendations:

1) Continue with pureed heart healthy diet

2) Monitor need for ONS

3) Wt per rx

-------------------------------------------------------------------------------

Addendum: 01/13/19 at 1151 by Dana Chaparro RD

-------------------------------------------------------------------------------

Amended: Links added.

## 2019-01-14 VITALS — DIASTOLIC BLOOD PRESSURE: 63 MMHG | SYSTOLIC BLOOD PRESSURE: 144 MMHG

## 2019-01-14 VITALS — SYSTOLIC BLOOD PRESSURE: 140 MMHG | DIASTOLIC BLOOD PRESSURE: 68 MMHG

## 2019-01-14 VITALS — DIASTOLIC BLOOD PRESSURE: 48 MMHG | SYSTOLIC BLOOD PRESSURE: 130 MMHG

## 2019-01-14 VITALS — DIASTOLIC BLOOD PRESSURE: 64 MMHG | SYSTOLIC BLOOD PRESSURE: 140 MMHG

## 2019-01-14 LAB
ALBUMIN SERPL BCP-MCNC: 2.2 G/DL (ref 3.4–5)
ANION GAP SERPL CALCULATED.3IONS-SCNC: 10 MMOL/L (ref 8–16)
BASOPHILS # BLD AUTO: 0 X10'3 (ref 0–0.2)
BASOPHILS NFR BLD AUTO: 0.3 % (ref 0–1)
BUN SERPL-MCNC: 14 MG/DL (ref 7–18)
BUN/CREAT SERPL: 23.3 (ref 6.6–38)
CALCIUM SERPL-MCNC: 8.4 MG/DL (ref 8.5–10.1)
CHLORIDE SERPL-SCNC: 105 MMOL/L (ref 99–107)
CO2 SERPL-SCNC: 25.7 MMOL/L (ref 24–32)
CREAT SERPL-MCNC: 0.6 MG/DL (ref 0.4–0.9)
EOSINOPHIL # BLD AUTO: 0 X10'3 (ref 0–0.9)
EOSINOPHIL NFR BLD AUTO: 0.1 % (ref 0–6)
ERYTHROCYTE [DISTWIDTH] IN BLOOD BY AUTOMATED COUNT: 13.4 % (ref 11.5–14.5)
GFR SERPL CREATININE-BSD FRML MDRD: > 90 ML/MIN
GLUCOSE SERPL-MCNC: 301 MG/DL (ref 70–104)
HCT VFR BLD AUTO: 32.2 % (ref 35–45)
HGB BLD-MCNC: 10.6 G/DL (ref 12–16)
LYMPHOCYTES # BLD AUTO: 1.6 X10'3 (ref 1.1–4.8)
LYMPHOCYTES NFR BLD AUTO: 11.7 % (ref 21–51)
MAGNESIUM SERPL-MCNC: 1.7 MG/DL (ref 1.5–2.4)
MCH RBC QN AUTO: 28.8 PG (ref 27–31)
MCHC RBC AUTO-ENTMCNC: 32.9 % (ref 33–36.5)
MCV RBC AUTO: 87.5 FL (ref 78–98)
MONOCYTES # BLD AUTO: 0.4 X10'3 (ref 0–0.9)
MONOCYTES NFR BLD AUTO: 2.7 % (ref 2–12)
NEUTROPHILS # BLD AUTO: 11.5 X10'3 (ref 1.8–7.7)
NEUTROPHILS NFR BLD AUTO: 85.2 % (ref 42–75)
PLATELET # BLD AUTO: 376 X10'3 (ref 140–440)
PMV BLD AUTO: 9.3 FL (ref 7.4–10.4)
POTASSIUM SERPL-SCNC: 3.7 MMOL/L (ref 3.5–5.1)
RBC # BLD AUTO: 3.68 X10'6 (ref 4.2–5.6)
SODIUM SERPL-SCNC: 141 MMOL/L (ref 135–145)
WBC # BLD AUTO: 13.5 X10'3 (ref 4.5–11)

## 2019-01-14 RX ADMIN — MINERAL SUPPLEMENT IRON 300 MG / 5 ML STRENGTH LIQUID 100 PER BOX UNFLAVORED SCH MG: at 08:00

## 2019-01-14 RX ADMIN — Medication SCH MMU: at 20:00

## 2019-01-14 RX ADMIN — INSULIN GLARGINE SCH UNIT: 100 INJECTION, SOLUTION SUBCUTANEOUS at 20:33

## 2019-01-14 RX ADMIN — INSULIN LISPRO SCH UNITS: 100 INJECTION, SOLUTION INTRAVENOUS; SUBCUTANEOUS at 09:09

## 2019-01-14 RX ADMIN — VANCOMYCIN HYDROCHLORIDE SCH MLS/HR: 750 INJECTION, POWDER, LYOPHILIZED, FOR SOLUTION INTRAVENOUS at 17:15

## 2019-01-14 RX ADMIN — DOCUSATE SODIUM SCH MG: 50 LIQUID ORAL at 08:00

## 2019-01-14 RX ADMIN — MINERAL SUPPLEMENT IRON 300 MG / 5 ML STRENGTH LIQUID 100 PER BOX UNFLAVORED SCH MG: at 20:21

## 2019-01-14 RX ADMIN — DOCUSATE SODIUM SCH MG: 50 LIQUID ORAL at 20:21

## 2019-01-14 RX ADMIN — HEPARIN SODIUM SCH UNIT: 5000 INJECTION, SOLUTION INTRAVENOUS; SUBCUTANEOUS at 08:00

## 2019-01-14 RX ADMIN — HEPARIN SODIUM SCH UNIT: 5000 INJECTION, SOLUTION INTRAVENOUS; SUBCUTANEOUS at 20:21

## 2019-01-14 RX ADMIN — SODIUM CHLORIDE SCH MLS/HR: 9 INJECTION INTRAMUSCULAR; INTRAVENOUS; SUBCUTANEOUS at 00:19

## 2019-01-14 NOTE — NUR
Patient have not had dinner - will not administer nutritional/correctional insulin as her BG 
was 80mg/dL.

## 2019-01-14 NOTE — NUR
Problems reprioritized. Patient report given, questions answered & plan of care reviewed 
with BLAKE Mike. Patient sleeping at this time. Call light and items of frequent use in reach 
of patient

## 2019-01-14 NOTE — NUR
patient not opening eyes to voice or pain. Patient localizes to pain but will not wake up. 
Charge nurse (Jeni) came in to assess the patient and had no response either. Will page 
Dr. Watkins.

## 2019-01-14 NOTE — NUR
Patient in room [facility] 352. I have received report from BLAKE Mike and had the opportunity to 
ask questions and assume patient care. Patient resting at this time. In no apparent distress 
on 2L NC. Call light and items of frequent use in reach of patient.

## 2019-01-14 NOTE — NUR
Patient unresponsive to voice, sternal rub done and opened eyes. O2 Saturation is 71% 2L NC. 
Charge RN and Marce RN at bedside positioned patient to high longo's and suctioned 
patient. O2 Sat now up to 90%. 

1917 MD Marty called and he deferred to hospitalist care. 

1923 MD Elie called, came by and checked on patient. Placed on non-rebreather - now O2 
sat at 96%. 

Will continue to monitor.

## 2019-01-14 NOTE — NUR
Problems reprioritized. Patient report given, questions answered & plan of care reviewed 
with BLAKE Kessler.

## 2019-01-14 NOTE — NUR
Dr. Watkins to see patient. Dr. Watkins did a sternal rub and patient opened eyes. Dr. Watkins 
says this is probably baseline. Will continue to monitor patient.

## 2019-01-15 VITALS — DIASTOLIC BLOOD PRESSURE: 70 MMHG | SYSTOLIC BLOOD PRESSURE: 130 MMHG

## 2019-01-15 VITALS — SYSTOLIC BLOOD PRESSURE: 124 MMHG | DIASTOLIC BLOOD PRESSURE: 73 MMHG

## 2019-01-15 VITALS — SYSTOLIC BLOOD PRESSURE: 132 MMHG | DIASTOLIC BLOOD PRESSURE: 57 MMHG

## 2019-01-15 VITALS — SYSTOLIC BLOOD PRESSURE: 160 MMHG | DIASTOLIC BLOOD PRESSURE: 62 MMHG

## 2019-01-15 LAB
ALBUMIN SERPL BCP-MCNC: 2 G/DL (ref 3.4–5)
ANION GAP SERPL CALCULATED.3IONS-SCNC: 9 MMOL/L (ref 8–16)
BASOPHILS # BLD AUTO: 0 X10'3 (ref 0–0.2)
BASOPHILS NFR BLD AUTO: 0.1 % (ref 0–1)
BUN SERPL-MCNC: 14 MG/DL (ref 7–18)
BUN/CREAT SERPL: 28 (ref 6.6–38)
CALCIUM SERPL-MCNC: 8.6 MG/DL (ref 8.5–10.1)
CHLORIDE SERPL-SCNC: 107 MMOL/L (ref 99–107)
CO2 SERPL-SCNC: 28.1 MMOL/L (ref 24–32)
CREAT SERPL-MCNC: 0.5 MG/DL (ref 0.4–0.9)
EOSINOPHIL # BLD AUTO: 0 X10'3 (ref 0–0.9)
EOSINOPHIL NFR BLD AUTO: 0 % (ref 0–6)
ERYTHROCYTE [DISTWIDTH] IN BLOOD BY AUTOMATED COUNT: 13.4 % (ref 11.5–14.5)
GFR SERPL CREATININE-BSD FRML MDRD: > 90 ML/MIN
GLUCOSE SERPL-MCNC: 90 MG/DL (ref 70–104)
HCT VFR BLD AUTO: 32.6 % (ref 35–45)
HGB BLD-MCNC: 10.6 G/DL (ref 12–16)
LYMPHOCYTES # BLD AUTO: 1.4 X10'3 (ref 1.1–4.8)
LYMPHOCYTES NFR BLD AUTO: 11.6 % (ref 21–51)
MAGNESIUM SERPL-MCNC: 1.7 MG/DL (ref 1.5–2.4)
MCH RBC QN AUTO: 28.3 PG (ref 27–31)
MCHC RBC AUTO-ENTMCNC: 32.4 % (ref 33–36.5)
MCV RBC AUTO: 87.2 FL (ref 78–98)
MONOCYTES # BLD AUTO: 0.2 X10'3 (ref 0–0.9)
MONOCYTES NFR BLD AUTO: 2 % (ref 2–12)
NEUTROPHILS # BLD AUTO: 10.8 X10'3 (ref 1.8–7.7)
NEUTROPHILS NFR BLD AUTO: 86.3 % (ref 42–75)
PLATELET # BLD AUTO: 368 X10'3 (ref 140–440)
PMV BLD AUTO: 8.7 FL (ref 7.4–10.4)
POTASSIUM SERPL-SCNC: 3.1 MMOL/L (ref 3.5–5.1)
RBC # BLD AUTO: 3.74 X10'6 (ref 4.2–5.6)
SODIUM SERPL-SCNC: 144 MMOL/L (ref 135–145)
WBC # BLD AUTO: 12.4 X10'3 (ref 4.5–11)

## 2019-01-15 RX ADMIN — MINERAL SUPPLEMENT IRON 300 MG / 5 ML STRENGTH LIQUID 100 PER BOX UNFLAVORED SCH MG: at 20:00

## 2019-01-15 RX ADMIN — SODIUM CHLORIDE SCH MLS/HR: 9 INJECTION INTRAMUSCULAR; INTRAVENOUS; SUBCUTANEOUS at 04:22

## 2019-01-15 RX ADMIN — HEPARIN SODIUM SCH UNIT: 5000 INJECTION, SOLUTION INTRAVENOUS; SUBCUTANEOUS at 09:29

## 2019-01-15 RX ADMIN — IPRATROPIUM BROMIDE AND ALBUTEROL SULFATE SCH ML: .5; 3 SOLUTION RESPIRATORY (INHALATION) at 20:18

## 2019-01-15 RX ADMIN — SODIUM CHLORIDE SCH MLS/HR: 0.9 INJECTION, SOLUTION INTRAVENOUS at 17:00

## 2019-01-15 RX ADMIN — MINERAL SUPPLEMENT IRON 300 MG / 5 ML STRENGTH LIQUID 100 PER BOX UNFLAVORED SCH MG: at 08:00

## 2019-01-15 RX ADMIN — BUDESONIDE SCH MG: 0.5 INHALANT RESPIRATORY (INHALATION) at 07:51

## 2019-01-15 RX ADMIN — DOCUSATE SODIUM SCH MG: 50 LIQUID ORAL at 08:00

## 2019-01-15 RX ADMIN — IPRATROPIUM BROMIDE AND ALBUTEROL SULFATE SCH ML: .5; 3 SOLUTION RESPIRATORY (INHALATION) at 14:38

## 2019-01-15 RX ADMIN — Medication SCH MMU: at 20:00

## 2019-01-15 RX ADMIN — INSULIN GLARGINE SCH UNIT: 100 INJECTION, SOLUTION SUBCUTANEOUS at 21:00

## 2019-01-15 RX ADMIN — DOCUSATE SODIUM SCH MG: 50 LIQUID ORAL at 20:00

## 2019-01-15 RX ADMIN — IPRATROPIUM BROMIDE AND ALBUTEROL SULFATE SCH ML: .5; 3 SOLUTION RESPIRATORY (INHALATION) at 07:50

## 2019-01-15 RX ADMIN — SODIUM CHLORIDE SCH MLS/HR: 9 INJECTION INTRAMUSCULAR; INTRAVENOUS; SUBCUTANEOUS at 16:55

## 2019-01-15 RX ADMIN — HEPARIN SODIUM SCH UNIT: 5000 INJECTION, SOLUTION INTRAVENOUS; SUBCUTANEOUS at 20:32

## 2019-01-15 RX ADMIN — BUDESONIDE SCH MG: 0.5 INHALANT RESPIRATORY (INHALATION) at 20:19

## 2019-01-15 RX ADMIN — Medication SCH MMU: at 08:00

## 2019-01-15 NOTE — NUR
Problems reprioritized. Patient report given, questions answered & plan of care reviewed 
with Frank LOZANO.

## 2019-01-15 NOTE — NUR
Bladder scanned patient with norwood catheter in place. Bladder scan showes 102ml's in 
bladder. 

-------------------------------------------------------------------------------

Addendum: 01/15/19 at 1158 by Sasha Vicente RN

-------------------------------------------------------------------------------

Dr Roland foy

## 2019-01-15 NOTE — NUR
Notified Dr Watkins of patients condition this morning. Patients is on 50% Venti mask sats 
are 98%, Patients RR 20 Patient is non responsive to name sternal rub patient winces but 
will not open eyes or respond to commands. Per Dr Watkins he would like patients sats to be 
93% paged RT to assist with venti mask.

## 2019-01-15 NOTE — NUR
Called by RN to NT Suction patient.Patient currently of 50% venti mask with an SpO2 of 
96-97%.  Auscultation reveals diminished breath sounds with few rhonchi.  NT suction not 
indicated at this time.  RN to page RT if conditions change.

## 2019-01-15 NOTE — NUR
Problems reprioritized. Patient report given, questions answered & plan of care reviewed 
with BLAKE Baez.

## 2019-01-15 NOTE — NUR
Patient in room [facility] 352. I have received report from  BLAKE Mike and had the opportunity to 
ask questions and assume patient care.

## 2019-01-15 NOTE — NUR
Reagan GARY RN have reviewed BLAKE Mike physical assessment charting and agree with her 
documentation.

## 2019-01-16 VITALS — DIASTOLIC BLOOD PRESSURE: 74 MMHG | SYSTOLIC BLOOD PRESSURE: 137 MMHG

## 2019-01-16 VITALS — DIASTOLIC BLOOD PRESSURE: 66 MMHG | SYSTOLIC BLOOD PRESSURE: 149 MMHG

## 2019-01-16 VITALS — DIASTOLIC BLOOD PRESSURE: 70 MMHG | SYSTOLIC BLOOD PRESSURE: 165 MMHG

## 2019-01-16 VITALS — SYSTOLIC BLOOD PRESSURE: 150 MMHG | DIASTOLIC BLOOD PRESSURE: 62 MMHG

## 2019-01-16 LAB
ALBUMIN SERPL BCP-MCNC: 1.8 G/DL (ref 3.4–5)
AMORPH URATE CRY #/AREA URNS HPF: (no result) /[HPF]
ANION GAP SERPL CALCULATED.3IONS-SCNC: 9 MMOL/L (ref 8–16)
BACTERIA URNS QL MICRO: (no result) /HPF
BASOPHILS # BLD AUTO: 0 X10'3 (ref 0–0.2)
BASOPHILS NFR BLD AUTO: 0.2 % (ref 0–1)
BUN SERPL-MCNC: 16 MG/DL (ref 7–18)
BUN/CREAT SERPL: 37.2 (ref 6.6–38)
CALCIUM SERPL-MCNC: 9 MG/DL (ref 8.5–10.1)
CHLORIDE SERPL-SCNC: 111 MMOL/L (ref 99–107)
CLARITY UR: (no result)
CO2 SERPL-SCNC: 25.7 MMOL/L (ref 24–32)
COARSE GRAN CASTS URNS QL MICRO: >30 /LPF
COLOR UR: YELLOW
CREAT SERPL-MCNC: 0.43 MG/DL (ref 0.4–0.9)
DEPRECATED SQUAMOUS URNS QL MICRO: (no result) /LPF
EOSINOPHIL # BLD AUTO: 0 X10'3 (ref 0–0.9)
EOSINOPHIL NFR BLD AUTO: 0 % (ref 0–6)
ERYTHROCYTE [DISTWIDTH] IN BLOOD BY AUTOMATED COUNT: 14.9 % (ref 11.5–14.5)
GFR SERPL CREATININE-BSD FRML MDRD: > 90 ML/MIN
GLUCOSE SERPL-MCNC: 94 MG/DL (ref 70–104)
GLUCOSE UR STRIP-MCNC: NEGATIVE MG/DL
HCT VFR BLD AUTO: 29.8 % (ref 35–45)
HGB BLD-MCNC: 9.6 G/DL (ref 12–16)
HGB UR QL STRIP: (no result)
KETONES UR STRIP-MCNC: 15 MG/DL
LEUKOCYTE ESTERASE UR QL STRIP: (no result)
LYMPHOCYTES # BLD AUTO: 0.7 X10'3 (ref 1.1–4.8)
LYMPHOCYTES NFR BLD AUTO: 10.4 % (ref 21–51)
MAGNESIUM SERPL-MCNC: 2 MG/DL (ref 1.5–2.4)
MCH RBC QN AUTO: 28 PG (ref 27–31)
MCHC RBC AUTO-ENTMCNC: 32.1 % (ref 33–36.5)
MCV RBC AUTO: 87.4 FL (ref 78–98)
MONOCYTES # BLD AUTO: 0.2 X10'3 (ref 0–0.9)
MONOCYTES NFR BLD AUTO: 3.5 % (ref 2–12)
NEUTROPHILS # BLD AUTO: 5.5 X10'3 (ref 1.8–7.7)
NEUTROPHILS NFR BLD AUTO: 85.9 % (ref 42–75)
NITRITE UR QL STRIP: POSITIVE
PH UR STRIP: 5.5 [PH] (ref 4.8–8)
PLATELET # BLD AUTO: 336 X10'3 (ref 140–440)
PMV BLD AUTO: 8 FL (ref 7.4–10.4)
POTASSIUM SERPL-SCNC: 3.4 MMOL/L (ref 3.5–5.1)
PROT UR QL STRIP: 30 MG/DL
RBC # BLD AUTO: 3.41 X10'6 (ref 4.2–5.6)
SODIUM SERPL-SCNC: 146 MMOL/L (ref 135–145)
SP GR UR STRIP: >=1.03 (ref 1–1.03)
URN COLLECT METHOD CLASS: (no result)
UROBILINOGEN UR STRIP-MCNC: 0.2 E.U/DL (ref 0.2–1)
WBC # BLD AUTO: 6.4 X10'3 (ref 4.5–11)

## 2019-01-16 RX ADMIN — DOCUSATE SODIUM SCH MG: 50 LIQUID ORAL at 08:00

## 2019-01-16 RX ADMIN — Medication SCH MMU: at 20:00

## 2019-01-16 RX ADMIN — HEPARIN SODIUM SCH UNIT: 5000 INJECTION, SOLUTION INTRAVENOUS; SUBCUTANEOUS at 21:51

## 2019-01-16 RX ADMIN — MINERAL SUPPLEMENT IRON 300 MG / 5 ML STRENGTH LIQUID 100 PER BOX UNFLAVORED SCH MG: at 20:00

## 2019-01-16 RX ADMIN — IPRATROPIUM BROMIDE AND ALBUTEROL SULFATE SCH ML: .5; 3 SOLUTION RESPIRATORY (INHALATION) at 07:20

## 2019-01-16 RX ADMIN — SODIUM CHLORIDE SCH MLS/HR: 9 INJECTION INTRAMUSCULAR; INTRAVENOUS; SUBCUTANEOUS at 07:11

## 2019-01-16 RX ADMIN — BUDESONIDE SCH MG: 0.5 INHALANT RESPIRATORY (INHALATION) at 07:20

## 2019-01-16 RX ADMIN — IPRATROPIUM BROMIDE AND ALBUTEROL SULFATE SCH ML: .5; 3 SOLUTION RESPIRATORY (INHALATION) at 20:58

## 2019-01-16 RX ADMIN — HEPARIN SODIUM SCH UNIT: 5000 INJECTION, SOLUTION INTRAVENOUS; SUBCUTANEOUS at 07:12

## 2019-01-16 RX ADMIN — BUDESONIDE SCH MG: 0.5 INHALANT RESPIRATORY (INHALATION) at 20:58

## 2019-01-16 RX ADMIN — INSULIN GLARGINE SCH UNIT: 100 INJECTION, SOLUTION SUBCUTANEOUS at 21:00

## 2019-01-16 RX ADMIN — IPRATROPIUM BROMIDE AND ALBUTEROL SULFATE SCH ML: .5; 3 SOLUTION RESPIRATORY (INHALATION) at 15:16

## 2019-01-16 RX ADMIN — DOCUSATE SODIUM SCH MG: 50 LIQUID ORAL at 20:00

## 2019-01-16 RX ADMIN — SODIUM CHLORIDE SCH MLS/HR: 0.9 INJECTION, SOLUTION INTRAVENOUS at 16:54

## 2019-01-16 RX ADMIN — Medication SCH MMU: at 08:00

## 2019-01-16 RX ADMIN — SODIUM CHLORIDE AND POTASSIUM CHLORIDE SCH MLS/HR: 4.5; 1.49 INJECTION, SOLUTION INTRAVENOUS at 22:15

## 2019-01-16 RX ADMIN — MINERAL SUPPLEMENT IRON 300 MG / 5 ML STRENGTH LIQUID 100 PER BOX UNFLAVORED SCH MG: at 08:00

## 2019-01-16 RX ADMIN — SODIUM CHLORIDE AND POTASSIUM CHLORIDE SCH MLS/HR: 4.5; 1.49 INJECTION, SOLUTION INTRAVENOUS at 14:55

## 2019-01-16 RX ADMIN — IPRATROPIUM BROMIDE AND ALBUTEROL SULFATE SCH ML: .5; 3 SOLUTION RESPIRATORY (INHALATION) at 02:05

## 2019-01-16 NOTE — NUR
Patient in room [facility] 352. I have received report from Sasha LOZANO and had the opportunity to ask 
questions and assume patient care. Patient resting in bed eyes closed, respirations even. 
Will continue to monitor.

## 2019-01-16 NOTE — NUR
Reassessment:Per MD note pt went into respiratory failure 1/14 and pt 

continues on BiPAP. Pt previously on Heart Healthy and CHO control diet w/ 

100% PO intake however, documented refusals of meals since 1/14 and diet 

order has been d/c'ed. LBM 1/16, noted to be diarrhea. Will continue to 

monitor and make rec as appropriate.

Recommendations:

1) Advance to heart healthy CHO control diet as medically indicated

2) Monitor need for ONS with diet advancement

3) Wt per rx

-------------------------------------------------------------------------------

Addendum: 01/16/19 at 1516 by Denise Oliveira RD

-------------------------------------------------------------------------------

Amended: Links added.

-------------------------------------------------------------------------------

Addendum: 01/16/19 at 1538 by Dana Chaparro RD

-------------------------------------------------------------------------------

I have reviewed and approve of note by Dietetic Intern. Dana Chaparro RD

## 2019-01-16 NOTE — NUR
Problems reprioritized. Patient report given, questions answered & plan of care reviewed 
with Steffi LOZANO.

## 2019-01-16 NOTE — NUR
Patient in room [facility] 352. I have received report from Frank LOZANO   and had the opportunity to 
ask questions and assume patient care.

## 2019-01-16 NOTE — NUR
Problems reprioritized. Patient report given, questions answered & plan of care reviewed 
with SARAH. 

-------------------------------------------------------------------------------

Addendum: 01/16/19 at 0703 by Doug Bhandari RN

-------------------------------------------------------------------------------

Amended: Links added.

## 2019-01-17 VITALS — DIASTOLIC BLOOD PRESSURE: 74 MMHG | SYSTOLIC BLOOD PRESSURE: 149 MMHG

## 2019-01-17 VITALS — SYSTOLIC BLOOD PRESSURE: 129 MMHG | DIASTOLIC BLOOD PRESSURE: 58 MMHG

## 2019-01-17 VITALS — DIASTOLIC BLOOD PRESSURE: 64 MMHG | SYSTOLIC BLOOD PRESSURE: 146 MMHG

## 2019-01-17 VITALS — DIASTOLIC BLOOD PRESSURE: 64 MMHG | SYSTOLIC BLOOD PRESSURE: 144 MMHG

## 2019-01-17 LAB
MAGNESIUM SERPL-MCNC: 1.7 MG/DL (ref 1.5–2.4)
POTASSIUM SERPL-SCNC: 4.2 MMOL/L (ref 3.5–5.1)

## 2019-01-17 RX ADMIN — IPRATROPIUM BROMIDE AND ALBUTEROL SULFATE SCH ML: .5; 3 SOLUTION RESPIRATORY (INHALATION) at 20:13

## 2019-01-17 RX ADMIN — BUDESONIDE SCH MG: 0.5 INHALANT RESPIRATORY (INHALATION) at 08:00

## 2019-01-17 RX ADMIN — INSULIN GLARGINE SCH UNIT: 100 INJECTION, SOLUTION SUBCUTANEOUS at 21:00

## 2019-01-17 RX ADMIN — HEPARIN SODIUM SCH UNIT: 5000 INJECTION, SOLUTION INTRAVENOUS; SUBCUTANEOUS at 19:44

## 2019-01-17 RX ADMIN — Medication SCH MMU: at 19:28

## 2019-01-17 RX ADMIN — IPRATROPIUM BROMIDE AND ALBUTEROL SULFATE SCH ML: .5; 3 SOLUTION RESPIRATORY (INHALATION) at 03:21

## 2019-01-17 RX ADMIN — DOCUSATE SODIUM SCH MG: 50 LIQUID ORAL at 07:50

## 2019-01-17 RX ADMIN — MINERAL SUPPLEMENT IRON 300 MG / 5 ML STRENGTH LIQUID 100 PER BOX UNFLAVORED SCH MG: at 19:29

## 2019-01-17 RX ADMIN — IPRATROPIUM BROMIDE AND ALBUTEROL SULFATE SCH ML: .5; 3 SOLUTION RESPIRATORY (INHALATION) at 08:06

## 2019-01-17 RX ADMIN — DOCUSATE SODIUM SCH MG: 50 LIQUID ORAL at 19:28

## 2019-01-17 RX ADMIN — BUDESONIDE SCH MG: 0.5 INHALANT RESPIRATORY (INHALATION) at 20:13

## 2019-01-17 RX ADMIN — HEPARIN SODIUM SCH UNIT: 5000 INJECTION, SOLUTION INTRAVENOUS; SUBCUTANEOUS at 07:55

## 2019-01-17 RX ADMIN — MINERAL SUPPLEMENT IRON 300 MG / 5 ML STRENGTH LIQUID 100 PER BOX UNFLAVORED SCH MG: at 07:50

## 2019-01-17 RX ADMIN — SODIUM CHLORIDE AND POTASSIUM CHLORIDE SCH MLS/HR: 4.5; 1.49 INJECTION, SOLUTION INTRAVENOUS at 07:54

## 2019-01-17 RX ADMIN — SODIUM CHLORIDE SCH MLS/HR: 0.9 INJECTION, SOLUTION INTRAVENOUS at 17:30

## 2019-01-17 RX ADMIN — Medication SCH MMU: at 07:50

## 2019-01-17 RX ADMIN — IPRATROPIUM BROMIDE AND ALBUTEROL SULFATE SCH ML: .5; 3 SOLUTION RESPIRATORY (INHALATION) at 15:00

## 2019-01-17 RX ADMIN — SODIUM CHLORIDE AND POTASSIUM CHLORIDE SCH MLS/HR: 4.5; 1.49 INJECTION, SOLUTION INTRAVENOUS at 17:30

## 2019-01-17 NOTE — NUR
Patient's son here concerned about patient's wound and repositioning. States that he feels 
that the patient is always in the same position. Explained that I had just changed her 
position with the nurse on dayshift during report. Then patient's son states that he does 
not want her sitting up. Explained that in position patient is in now she is only saturating 
80% because he lowered bed. Explained that the priority is her breathing at this time and 
elevated HOB and suctioned patient. Patient's son requested plan of care, I explained that I 
was on my way to give pain medication but that I would be back with my computer to further 
explain. He states he will wait for me to return.  When I returned patient's son was no 
longer in room. Called patient's son cellphone and he did not answer, I left message for 
return if he has any questions and apologized for the delay. Will continue to monitor.

## 2019-01-17 NOTE — NUR
Problems reprioritized. Patient report given, questions answered & plan of care reviewed 
with BLAKE Goodrich. Patient resting comfortably at this time. Patient was repositioned with 
Joleen LOZANO. No apparent distress on 4L NC. Call light and items of frequent use in reach of 
patient.

## 2019-01-17 NOTE — NUR
Patient in room [facility] 352. I have received report from BLAKE Goodrich and had the opportunity to 
ask questions and assume patient care. Patient eyes open at this time. In no apparent 
distress on 4L NC with NC in mouth. Call light and items of frequent use in reach of 
patient.

## 2019-01-17 NOTE — NUR
Problems reprioritized. Patient report given, questions answered & plan of care reviewed 
with Victoria LOZANO. Patient resting with nasal cannula in mouth.

## 2019-01-17 NOTE — NUR
Patient in room [facility] 352. I have received report from Victoria LOZANO and had the opportunity to 
ask questions and assume patient care. Patient resting with oxygen saturation in mid 90s, 
repositioned patient with RN. Will continue to monitor.

## 2019-01-18 VITALS — SYSTOLIC BLOOD PRESSURE: 155 MMHG | DIASTOLIC BLOOD PRESSURE: 75 MMHG

## 2019-01-18 VITALS — DIASTOLIC BLOOD PRESSURE: 72 MMHG | SYSTOLIC BLOOD PRESSURE: 132 MMHG

## 2019-01-18 VITALS — DIASTOLIC BLOOD PRESSURE: 65 MMHG | SYSTOLIC BLOOD PRESSURE: 127 MMHG

## 2019-01-18 VITALS — DIASTOLIC BLOOD PRESSURE: 63 MMHG | SYSTOLIC BLOOD PRESSURE: 142 MMHG

## 2019-01-18 LAB
ALBUMIN SERPL BCP-MCNC: 1.6 G/DL (ref 3.4–5)
ANION GAP SERPL CALCULATED.3IONS-SCNC: 13 MMOL/L (ref 8–16)
ANISOCYTOSIS BLD QL SMEAR: (no result)
BASOPHILS # BLD AUTO: 0 X10'3 (ref 0–0.2)
BASOPHILS NFR BLD AUTO: 0.1 % (ref 0–1)
BUN SERPL-MCNC: 15 MG/DL (ref 7–18)
BUN/CREAT SERPL: 27.8 (ref 6.6–38)
CALCIUM SERPL-MCNC: 8.8 MG/DL (ref 8.5–10.1)
CHLORIDE SERPL-SCNC: 107 MMOL/L (ref 99–107)
CO2 SERPL-SCNC: 22.2 MMOL/L (ref 24–32)
CREAT SERPL-MCNC: 0.54 MG/DL (ref 0.4–0.9)
EOSINOPHIL # BLD AUTO: 0 X10'3 (ref 0–0.9)
EOSINOPHIL NFR BLD AUTO: 0 % (ref 0–6)
ERYTHROCYTE [DISTWIDTH] IN BLOOD BY AUTOMATED COUNT: 14.9 % (ref 11.5–14.5)
GFR SERPL CREATININE-BSD FRML MDRD: > 90 ML/MIN
GLUCOSE SERPL-MCNC: 104 MG/DL (ref 70–104)
HCT VFR BLD AUTO: 29.3 % (ref 35–45)
HGB BLD-MCNC: 9.3 G/DL (ref 12–16)
HYPOCHROMIA BLD QL SMEAR: (no result)
LYMPHOCYTES # BLD AUTO: 0.6 X10'3 (ref 1.1–4.8)
LYMPHOCYTES NFR BLD AUTO: 8.6 % (ref 21–51)
LYMPHOCYTES NFR BLD MANUAL: 14 % (ref 21–51)
MAGNESIUM SERPL-MCNC: 1.8 MG/DL (ref 1.5–2.4)
MCH RBC QN AUTO: 28 PG (ref 27–31)
MCHC RBC AUTO-ENTMCNC: 31.8 % (ref 33–36.5)
MCV RBC AUTO: 88.1 FL (ref 78–98)
MONOCYTES # BLD AUTO: 0.4 X10'3 (ref 0–0.9)
MONOCYTES NFR BLD AUTO: 5 % (ref 2–12)
MONOCYTES NFR BLD MANUAL: 9 % (ref 2–12)
NEUTROPHILS # BLD AUTO: 6.1 X10'3 (ref 1.8–7.7)
NEUTROPHILS NFR BLD AUTO: 86.3 % (ref 42–75)
NEUTS BAND # BLD MANUAL: 6 % (ref 0–10)
NEUTS SEG NFR BLD MANUAL: 71 % (ref 42–75)
PLATELET # BLD AUTO: 395 X10'3 (ref 140–440)
PLATELET BLD QL SMEAR: NORMAL
PMV BLD AUTO: 8.1 FL (ref 7.4–10.4)
POTASSIUM SERPL-SCNC: 3.9 MMOL/L (ref 3.5–5.1)
RBC # BLD AUTO: 3.33 X10'6 (ref 4.2–5.6)
RBC MORPH BLD: (no result)
SODIUM SERPL-SCNC: 142 MMOL/L (ref 135–145)
TOTAL CELLS COUNTED FLD: 100
WBC # BLD AUTO: 7.1 X10'3 (ref 4.5–11)

## 2019-01-18 RX ADMIN — BUDESONIDE SCH MG: 0.5 INHALANT RESPIRATORY (INHALATION) at 08:00

## 2019-01-18 RX ADMIN — DOCUSATE SODIUM SCH MG: 50 LIQUID ORAL at 20:00

## 2019-01-18 RX ADMIN — BUDESONIDE SCH MG: 0.25 SUSPENSION RESPIRATORY (INHALATION) at 20:51

## 2019-01-18 RX ADMIN — SODIUM CHLORIDE AND POTASSIUM CHLORIDE SCH MLS/HR: 4.5; 1.49 INJECTION, SOLUTION INTRAVENOUS at 17:19

## 2019-01-18 RX ADMIN — MINERAL SUPPLEMENT IRON 300 MG / 5 ML STRENGTH LIQUID 100 PER BOX UNFLAVORED SCH MG: at 07:10

## 2019-01-18 RX ADMIN — HEPARIN SODIUM SCH UNIT: 5000 INJECTION, SOLUTION INTRAVENOUS; SUBCUTANEOUS at 07:19

## 2019-01-18 RX ADMIN — Medication SCH MMU: at 07:10

## 2019-01-18 RX ADMIN — Medication SCH MMU: at 20:00

## 2019-01-18 RX ADMIN — HEPARIN SODIUM SCH UNIT: 5000 INJECTION, SOLUTION INTRAVENOUS; SUBCUTANEOUS at 20:10

## 2019-01-18 RX ADMIN — IPRATROPIUM BROMIDE AND ALBUTEROL SULFATE SCH ML: .5; 3 SOLUTION RESPIRATORY (INHALATION) at 08:11

## 2019-01-18 RX ADMIN — MINERAL SUPPLEMENT IRON 300 MG / 5 ML STRENGTH LIQUID 100 PER BOX UNFLAVORED SCH MG: at 20:00

## 2019-01-18 RX ADMIN — SODIUM CHLORIDE SCH MLS/HR: 0.9 INJECTION, SOLUTION INTRAVENOUS at 17:18

## 2019-01-18 RX ADMIN — INSULIN GLARGINE SCH UNIT: 100 INJECTION, SOLUTION SUBCUTANEOUS at 21:00

## 2019-01-18 RX ADMIN — SODIUM CHLORIDE AND POTASSIUM CHLORIDE SCH MLS/HR: 4.5; 1.49 INJECTION, SOLUTION INTRAVENOUS at 04:15

## 2019-01-18 RX ADMIN — SODIUM CHLORIDE AND POTASSIUM CHLORIDE SCH MLS/HR: 4.5; 1.49 INJECTION, SOLUTION INTRAVENOUS at 14:15

## 2019-01-18 RX ADMIN — IPRATROPIUM BROMIDE AND ALBUTEROL SULFATE SCH ML: .5; 3 SOLUTION RESPIRATORY (INHALATION) at 15:00

## 2019-01-18 RX ADMIN — IPRATROPIUM BROMIDE AND ALBUTEROL SULFATE SCH ML: .5; 3 SOLUTION RESPIRATORY (INHALATION) at 03:14

## 2019-01-18 RX ADMIN — DOCUSATE SODIUM SCH MG: 50 LIQUID ORAL at 07:10

## 2019-01-18 NOTE — NUR
Patient had episode of destatting, o2 down to 49 on NC @ 4L. RR 28, . Respiratory 
paged, came and placed pt on venturi mask at 40% and did NT suction. Pt now at 92%, sleeping 
comfortably.

## 2019-01-18 NOTE — NUR
Reassessment: Pt failed BSS 1/18 and SLP recs alt nutrition d/t unsafe PO, 

pt now day 4 with no nutrition. Pt currently w/o a diet order, paged MD 

the following: "Per SLP consult pt cannot swallow and recs alt nutrition. 

Noted that pt nonverbal, encephalopathic. Pt needs MD order for NPO. Is 

tube feeding part of care plan?". Will continue to follow patient's plan 

of care and make recommendations as appropriate.

Recommendations:

1) Continue NPO per SLP recs

2) Monitor appropriateness of nutrition r/t patient's current plan of care

3) Wt per rx

-------------------------------------------------------------------------------

Addendum: 01/18/19 at 1220 by Dana Chaparro RD

-------------------------------------------------------------------------------

Amended: Links added.

## 2019-01-18 NOTE — NUR
Problems reprioritized. Patient report given, questions answered & plan of care reviewed 
with BLAKE Velasquez.

## 2019-01-18 NOTE — NUR
Problems reprioritized. Patient report given, questions answered & plan of care reviewed 
with Altagracia RN. Patient resting eyes closed respirations even.

## 2019-01-19 VITALS — DIASTOLIC BLOOD PRESSURE: 86 MMHG | SYSTOLIC BLOOD PRESSURE: 189 MMHG

## 2019-01-19 VITALS — DIASTOLIC BLOOD PRESSURE: 76 MMHG | SYSTOLIC BLOOD PRESSURE: 162 MMHG

## 2019-01-19 VITALS — SYSTOLIC BLOOD PRESSURE: 130 MMHG | DIASTOLIC BLOOD PRESSURE: 57 MMHG

## 2019-01-19 LAB
ALBUMIN SERPL BCP-MCNC: 1.6 G/DL (ref 3.4–5)
ANION GAP SERPL CALCULATED.3IONS-SCNC: 12 MMOL/L (ref 8–16)
BASOPHILS # BLD AUTO: 0 X10'3 (ref 0–0.2)
BASOPHILS NFR BLD AUTO: 0.2 % (ref 0–1)
BUN SERPL-MCNC: 17 MG/DL (ref 7–18)
BUN/CREAT SERPL: 27.9 (ref 6.6–38)
CALCIUM SERPL-MCNC: 9 MG/DL (ref 8.5–10.1)
CHLORIDE SERPL-SCNC: 108 MMOL/L (ref 99–107)
CO2 SERPL-SCNC: 25.7 MMOL/L (ref 24–32)
CREAT SERPL-MCNC: 0.61 MG/DL (ref 0.4–0.9)
EOSINOPHIL # BLD AUTO: 0 X10'3 (ref 0–0.9)
EOSINOPHIL NFR BLD AUTO: 0 % (ref 0–6)
ERYTHROCYTE [DISTWIDTH] IN BLOOD BY AUTOMATED COUNT: 15.2 % (ref 11.5–14.5)
GFR SERPL CREATININE-BSD FRML MDRD: > 90 ML/MIN
GLUCOSE SERPL-MCNC: 95 MG/DL (ref 70–104)
HCT VFR BLD AUTO: 30.3 % (ref 35–45)
HGB BLD-MCNC: 9.7 G/DL (ref 12–16)
LYMPHOCYTES # BLD AUTO: 0.9 X10'3 (ref 1.1–4.8)
LYMPHOCYTES NFR BLD AUTO: 11.5 % (ref 21–51)
MCH RBC QN AUTO: 28.2 PG (ref 27–31)
MCHC RBC AUTO-ENTMCNC: 32.1 % (ref 33–36.5)
MCV RBC AUTO: 87.7 FL (ref 78–98)
MONOCYTES # BLD AUTO: 0.2 X10'3 (ref 0–0.9)
MONOCYTES NFR BLD AUTO: 3.2 % (ref 2–12)
NEUTROPHILS # BLD AUTO: 6.3 X10'3 (ref 1.8–7.7)
NEUTROPHILS NFR BLD AUTO: 85.1 % (ref 42–75)
PLATELET # BLD AUTO: 354 X10'3 (ref 140–440)
PMV BLD AUTO: 8.5 FL (ref 7.4–10.4)
POTASSIUM SERPL-SCNC: 3.3 MMOL/L (ref 3.5–5.1)
RBC # BLD AUTO: 3.45 X10'6 (ref 4.2–5.6)
SODIUM SERPL-SCNC: 146 MMOL/L (ref 135–145)
WBC # BLD AUTO: 7.4 X10'3 (ref 4.5–11)

## 2019-01-19 RX ADMIN — SODIUM CHLORIDE AND POTASSIUM CHLORIDE SCH MLS/HR: 4.5; 1.49 INJECTION, SOLUTION INTRAVENOUS at 08:21

## 2019-01-19 RX ADMIN — DOCUSATE SODIUM SCH MG: 50 LIQUID ORAL at 21:17

## 2019-01-19 RX ADMIN — HEPARIN SODIUM SCH UNIT: 5000 INJECTION, SOLUTION INTRAVENOUS; SUBCUTANEOUS at 19:50

## 2019-01-19 RX ADMIN — IPRATROPIUM BROMIDE AND ALBUTEROL SULFATE SCH ML: .5; 3 SOLUTION RESPIRATORY (INHALATION) at 19:16

## 2019-01-19 RX ADMIN — IPRATROPIUM BROMIDE AND ALBUTEROL SULFATE SCH ML: .5; 3 SOLUTION RESPIRATORY (INHALATION) at 09:00

## 2019-01-19 RX ADMIN — MINERAL SUPPLEMENT IRON 300 MG / 5 ML STRENGTH LIQUID 100 PER BOX UNFLAVORED SCH MG: at 20:00

## 2019-01-19 RX ADMIN — MINERAL SUPPLEMENT IRON 300 MG / 5 ML STRENGTH LIQUID 100 PER BOX UNFLAVORED SCH MG: at 07:25

## 2019-01-19 RX ADMIN — IPRATROPIUM BROMIDE AND ALBUTEROL SULFATE SCH ML: .5; 3 SOLUTION RESPIRATORY (INHALATION) at 03:14

## 2019-01-19 RX ADMIN — BUDESONIDE SCH MG: 0.25 SUSPENSION RESPIRATORY (INHALATION) at 19:17

## 2019-01-19 RX ADMIN — BUDESONIDE SCH MG: 0.25 SUSPENSION RESPIRATORY (INHALATION) at 08:00

## 2019-01-19 RX ADMIN — DOCUSATE SODIUM SCH MG: 50 LIQUID ORAL at 07:25

## 2019-01-19 RX ADMIN — INSULIN GLARGINE SCH UNIT: 100 INJECTION, SOLUTION SUBCUTANEOUS at 21:00

## 2019-01-19 RX ADMIN — IPRATROPIUM BROMIDE AND ALBUTEROL SULFATE SCH ML: .5; 3 SOLUTION RESPIRATORY (INHALATION) at 15:00

## 2019-01-19 RX ADMIN — Medication SCH MMU: at 07:25

## 2019-01-19 RX ADMIN — Medication SCH MMU: at 20:00

## 2019-01-19 RX ADMIN — HEPARIN SODIUM SCH UNIT: 5000 INJECTION, SOLUTION INTRAVENOUS; SUBCUTANEOUS at 08:27

## 2019-01-19 NOTE — NUR
Problems reprioritized. Patient report given, questions answered & plan of care reviewed 
with Ron LOZANO.

## 2019-01-19 NOTE — NUR
Son Lavell is at bedside. Comfort Care was discussed with Lavell. He states that he will go talk 
with other family members and make a decision concerning their wishes tomorrow.

## 2019-01-19 NOTE — NUR
Patient in room [facility] 352. I have received report from  Ron LOZANO  and had the opportunity to ask 
questions and assume patient care.

## 2019-01-19 NOTE — NUR
Problems reprioritized. Patient report given, questions answered & plan of care reviewed 
with Karo LOZANO.

## 2019-01-19 NOTE — NUR
Called pharmacy to inform patient doesn't have an IV but has Latonia scheduled at 1700. 
Pharmacist states that it's a Qday order and it can be given late when IV started and the 
Latonia Cuellar will see the start time.

## 2019-01-20 VITALS — DIASTOLIC BLOOD PRESSURE: 71 MMHG | SYSTOLIC BLOOD PRESSURE: 161 MMHG

## 2019-01-20 VITALS — DIASTOLIC BLOOD PRESSURE: 81 MMHG | SYSTOLIC BLOOD PRESSURE: 175 MMHG

## 2019-01-20 LAB
ALBUMIN SERPL BCP-MCNC: 1.7 G/DL (ref 3.4–5)
ANION GAP SERPL CALCULATED.3IONS-SCNC: 11 MMOL/L (ref 8–16)
BASOPHILS # BLD AUTO: 0 X10'3 (ref 0–0.2)
BASOPHILS NFR BLD AUTO: 0.3 % (ref 0–1)
BUN SERPL-MCNC: 13 MG/DL (ref 7–18)
BUN/CREAT SERPL: 23.6 (ref 6.6–38)
CALCIUM SERPL-MCNC: 8.9 MG/DL (ref 8.5–10.1)
CHLORIDE SERPL-SCNC: 109 MMOL/L (ref 99–107)
CO2 SERPL-SCNC: 25 MMOL/L (ref 24–32)
CREAT SERPL-MCNC: 0.55 MG/DL (ref 0.4–0.9)
EOSINOPHIL # BLD AUTO: 0 X10'3 (ref 0–0.9)
EOSINOPHIL NFR BLD AUTO: 0.1 % (ref 0–6)
ERYTHROCYTE [DISTWIDTH] IN BLOOD BY AUTOMATED COUNT: 15.1 % (ref 11.5–14.5)
GFR SERPL CREATININE-BSD FRML MDRD: > 90 ML/MIN
GLUCOSE SERPL-MCNC: 105 MG/DL (ref 70–104)
HCT VFR BLD AUTO: 31.7 % (ref 35–45)
HGB BLD-MCNC: 9.8 G/DL (ref 12–16)
LYMPHOCYTES # BLD AUTO: 1 X10'3 (ref 1.1–4.8)
LYMPHOCYTES NFR BLD AUTO: 11.8 % (ref 21–51)
MCH RBC QN AUTO: 27.3 PG (ref 27–31)
MCHC RBC AUTO-ENTMCNC: 31 % (ref 33–36.5)
MCV RBC AUTO: 88.2 FL (ref 78–98)
MONOCYTES # BLD AUTO: 0.4 X10'3 (ref 0–0.9)
MONOCYTES NFR BLD AUTO: 4.5 % (ref 2–12)
NEUTROPHILS # BLD AUTO: 7.2 X10'3 (ref 1.8–7.7)
NEUTROPHILS NFR BLD AUTO: 83.3 % (ref 42–75)
PLATELET # BLD AUTO: 371 X10'3 (ref 140–440)
PMV BLD AUTO: 8.3 FL (ref 7.4–10.4)
POTASSIUM SERPL-SCNC: 3.9 MMOL/L (ref 3.5–5.1)
RBC # BLD AUTO: 3.6 X10'6 (ref 4.2–5.6)
SODIUM SERPL-SCNC: 145 MMOL/L (ref 135–145)
WBC # BLD AUTO: 8.7 X10'3 (ref 4.5–11)

## 2019-01-20 RX ADMIN — IPRATROPIUM BROMIDE AND ALBUTEROL SULFATE SCH ML: .5; 3 SOLUTION RESPIRATORY (INHALATION) at 01:54

## 2019-01-20 RX ADMIN — BUDESONIDE SCH MG: 0.25 SUSPENSION RESPIRATORY (INHALATION) at 09:15

## 2019-01-20 RX ADMIN — Medication SCH MMU: at 06:50

## 2019-01-20 RX ADMIN — MINERAL SUPPLEMENT IRON 300 MG / 5 ML STRENGTH LIQUID 100 PER BOX UNFLAVORED SCH MG: at 06:51

## 2019-01-20 RX ADMIN — IPRATROPIUM BROMIDE AND ALBUTEROL SULFATE SCH ML: .5; 3 SOLUTION RESPIRATORY (INHALATION) at 17:50

## 2019-01-20 RX ADMIN — HEPARIN SODIUM SCH UNIT: 5000 INJECTION, SOLUTION INTRAVENOUS; SUBCUTANEOUS at 07:50

## 2019-01-20 RX ADMIN — IPRATROPIUM BROMIDE AND ALBUTEROL SULFATE SCH ML: .5; 3 SOLUTION RESPIRATORY (INHALATION) at 09:15

## 2019-01-20 RX ADMIN — DOCUSATE SODIUM SCH MG: 50 LIQUID ORAL at 06:50

## 2019-01-20 NOTE — NUR
RN IS TO DOCUMENT YES TO ALL APPLICABLE AREAS

Pronouncement of Death:

1. Time Physician Notified:[id number]

2. Date of Death:1/20/19

3. Time of Death: 1615

4. DNR/Withdraw life support documented:Y

5. Monitor strip has been placed on chart:Y

6. Assessment process is of one-minute duration and includes following criteria:

    a) Patient is unresponsive to all stimuli: Y

    b) Pupils fixed and non-reactive:Y

    c) Auscultation of precordium reveals absence of heart tones:Y

    d) Auscultation of lungs reveals absence of breath sounds:Y

    e) Absence of blood pressure / all vital signs:Y

    f) QRS complexes are not present on monitor / EKG strip:Y

    g) Pacer spikes without capture:Y

4. Comments: [place], WAS AT BEDSIDE AT THE TIME OF DEATH.

## 2019-01-20 NOTE — NUR
RRT called for patient desat in 40's on venti mask at 12L. Patient is now on a nonrebreather 
placed by RT. Patients saturation is not recovering, currently patient is sitting up and 
saturation is at 44%, , RR 38, Shallow. Son Lavell has been called and will be coming in 
to the hospital. Patients code status has been changed to Comfort Care with DNR, confirmed 
by Dr. Rooney and myself over the phone with Lavell. RRT team responded; RT, ICU RN, Dr. Rooney. Will continue to monitor and stay with patient until family arrives.

## 2019-01-20 NOTE — NUR
Problems reprioritized. Patient report given, questions answered & plan of care reviewed 
with edenilson mcknight.

## 2019-01-20 NOTE — NUR
Donor network called, Patient is not a Donor qualifier. [facility] has been called and notified 
to  patient. Paper work completed.
